# Patient Record
Sex: FEMALE | Race: WHITE | NOT HISPANIC OR LATINO | Employment: OTHER | URBAN - METROPOLITAN AREA
[De-identification: names, ages, dates, MRNs, and addresses within clinical notes are randomized per-mention and may not be internally consistent; named-entity substitution may affect disease eponyms.]

---

## 2023-11-16 ENCOUNTER — HOSPITAL ENCOUNTER (OUTPATIENT)
Facility: HOSPITAL | Age: 68
Setting detail: OUTPATIENT SURGERY
End: 2023-11-16
Attending: SURGERY | Admitting: SURGERY
Payer: MEDICARE

## 2023-11-16 ENCOUNTER — OFFICE VISIT (OUTPATIENT)
Dept: SURGERY | Facility: CLINIC | Age: 68
End: 2023-11-16
Payer: MEDICARE

## 2023-11-16 DIAGNOSIS — K43.9 VENTRAL HERNIA WITHOUT OBSTRUCTION OR GANGRENE: Primary | ICD-10-CM

## 2023-11-16 PROCEDURE — 1159F MED LIST DOCD IN RCRD: CPT | Performed by: SURGERY

## 2023-11-16 PROCEDURE — 1160F RVW MEDS BY RX/DR IN RCRD: CPT | Performed by: SURGERY

## 2023-11-16 PROCEDURE — 1126F AMNT PAIN NOTED NONE PRSNT: CPT | Performed by: SURGERY

## 2023-11-16 PROCEDURE — 99203 OFFICE O/P NEW LOW 30 MIN: CPT | Performed by: SURGERY

## 2023-11-16 RX ORDER — FLUOXETINE HYDROCHLORIDE 20 MG/1
20 CAPSULE ORAL
COMMUNITY
Start: 2019-06-07

## 2023-11-16 RX ORDER — METOPROLOL SUCCINATE 25 MG/1
15 TABLET, EXTENDED RELEASE ORAL DAILY
COMMUNITY
Start: 2023-08-29 | End: 2024-01-09 | Stop reason: WASHOUT

## 2023-11-16 ASSESSMENT — PAIN SCALES - GENERAL: PAINLEVEL: 0-NO PAIN

## 2023-11-16 NOTE — PROGRESS NOTES
History Of Present Illness  Catrachita Cantu is a 68 y.o. female presenting incisional hernia at her umbilicus.  She had surgery 20 years ago.  She has noticed this since then.  It does cause her problems at times.  She says she is seeing a surgeon for this at the [facility] also.  She had a CT scan which was transferred over which I was able to review.  She does have a history of cardiomyopathy but is able to review her most recent cardiology visit last several months along with her studies and this is all very stable.  She is retired .        Last Recorded Vitals  There were no vitals taken for this visit.  Physical Examination  He has a midline incision.  At the uppermost aspect of this incision she has a nonreducible hernia.      Relevant Results I reviewed her CT scan.  She had a small fascial defect with omentum inside this hernia.      Assessment/Plan nonreducible incisional hernia.  I discussed repair of this in an outpatient surgical procedure with deep sedation.  Based on the size of this we may not need to use mesh.  Because she is slightly obese if she does have some slight weakness I would use mesh for this she agrees with this plan.  I reviewed the hernia booklet with her.    Jim Urena MD FACS  Professor of Surgery  Kandi and Jose Guevara Chair in Surgical [place]  [facility] [employer]  [id number] [place] [address], [id number]  Phone [phone]  email: [email or web address]

## 2024-01-04 ENCOUNTER — PREP FOR PROCEDURE (OUTPATIENT)
Dept: SURGERY | Facility: CLINIC | Age: 69
End: 2024-01-04
Payer: MEDICARE

## 2024-01-04 DIAGNOSIS — K42.9 UMBILICAL HERNIA WITHOUT OBSTRUCTION AND WITHOUT GANGRENE: Primary | ICD-10-CM

## 2024-01-05 ENCOUNTER — HOSPITAL ENCOUNTER (OUTPATIENT)
Facility: HOSPITAL | Age: 69
Setting detail: OUTPATIENT SURGERY
End: 2024-01-05
Attending: SURGERY | Admitting: SURGERY
Payer: MEDICARE

## 2024-01-05 PROBLEM — K42.9 UMBILICAL HERNIA WITHOUT OBSTRUCTION AND WITHOUT GANGRENE: Status: ACTIVE | Noted: 2024-01-04

## 2024-01-08 RX ORDER — VERAPAMIL HYDROCHLORIDE 120 MG/1
TABLET, FILM COATED, EXTENDED RELEASE ORAL
COMMUNITY
Start: 2023-11-29 | End: 2024-01-09 | Stop reason: WASHOUT

## 2024-01-08 RX ORDER — ROSUVASTATIN CALCIUM 20 MG/1
1 TABLET, COATED ORAL NIGHTLY
COMMUNITY
Start: 2023-12-27 | End: 2024-01-09 | Stop reason: WASHOUT

## 2024-01-08 RX ORDER — DILTIAZEM HYDROCHLORIDE 120 MG/1
CAPSULE, COATED, EXTENDED RELEASE ORAL
COMMUNITY
Start: 2024-01-03 | End: 2024-01-09 | Stop reason: WASHOUT

## 2024-01-08 RX ORDER — CARVEDILOL 12.5 MG/1
TABLET ORAL
COMMUNITY
Start: 2023-12-18 | End: 2024-01-09 | Stop reason: WASHOUT

## 2024-01-08 RX ORDER — METOPROLOL SUCCINATE 50 MG/1
50 TABLET, EXTENDED RELEASE ORAL DAILY
COMMUNITY
Start: 2023-03-21 | End: 2024-01-09 | Stop reason: WASHOUT

## 2024-01-08 RX ORDER — MULTIVITAMIN
3 TABLET ORAL
COMMUNITY
End: 2024-01-09 | Stop reason: WASHOUT

## 2024-01-09 ENCOUNTER — OFFICE VISIT (OUTPATIENT)
Dept: CARDIOLOGY | Facility: CLINIC | Age: 69
End: 2024-01-09
Payer: MEDICARE

## 2024-01-09 VITALS
DIASTOLIC BLOOD PRESSURE: 80 MMHG | BODY MASS INDEX: 37.36 KG/M2 | HEIGHT: 62 IN | HEART RATE: 67 BPM | WEIGHT: 203 LBS | SYSTOLIC BLOOD PRESSURE: 132 MMHG | OXYGEN SATURATION: 97 %

## 2024-01-09 DIAGNOSIS — I42.1 HOCM (HYPERTROPHIC OBSTRUCTIVE CARDIOMYOPATHY) (MULTI): Primary | ICD-10-CM

## 2024-01-09 PROCEDURE — 1160F RVW MEDS BY RX/DR IN RCRD: CPT | Performed by: INTERNAL MEDICINE

## 2024-01-09 PROCEDURE — 1126F AMNT PAIN NOTED NONE PRSNT: CPT | Performed by: INTERNAL MEDICINE

## 2024-01-09 PROCEDURE — 1036F TOBACCO NON-USER: CPT | Performed by: INTERNAL MEDICINE

## 2024-01-09 PROCEDURE — 1159F MED LIST DOCD IN RCRD: CPT | Performed by: INTERNAL MEDICINE

## 2024-01-09 PROCEDURE — 99203 OFFICE O/P NEW LOW 30 MIN: CPT | Performed by: INTERNAL MEDICINE

## 2024-01-09 RX ORDER — BISOPROLOL FUMARATE 5 MG/1
2.5 TABLET, FILM COATED ORAL DAILY
Qty: 45 TABLET | Refills: 3 | Status: SHIPPED | OUTPATIENT
Start: 2024-01-09 | End: 2025-01-08

## 2024-01-09 RX ORDER — LORAZEPAM 0.5 MG/1
TABLET ORAL
COMMUNITY
Start: 2024-01-08 | End: 2024-03-07 | Stop reason: ALTCHOICE

## 2024-01-09 ASSESSMENT — ENCOUNTER SYMPTOMS
OCCASIONAL FEELINGS OF UNSTEADINESS: 0
IRREGULAR HEARTBEAT: 1
DEPRESSION: 1
DYSPNEA ON EXERTION: 1
LOSS OF SENSATION IN FEET: 0

## 2024-01-09 NOTE — PROGRESS NOTES
Counseling:  The patient was counseled regarding diagnostic results, instructions for management, risk factor reductions, prognosis, patient and family education, impressions, risks and benefits of treatment options and importance of compliance with treatment.      Chief Complaint:  The patient presents today for cardiovascular evaluation of HOCM, HTN and dyslipidemia.     History Of Present Illness:    Catrachita Cantu is a 68 y.o. female patient whose PMH is significant for HOCM, HTN, dyslipidemia, ventral and umbilical hernia, anxiety, borderline diabetes, depression, Merkel cell carcinoma s/p surgical removal 2016, mitral insufficiency, and SVT. She is a former patient of Dr. Abraham at [facility] who presents today to re-establish cardiovascular care for the continued management of HOCM, HTN and dyslipidemia. The patient states that she was diagnosed with HOCM 2-3 years ago. Since that time, she had been relatively asymptomatic up until December 2023 when she developed SOB with very minimal exertion and rapid heart rates. Other than metoprolol 25 mg, she has discontinued all medical management as she feels that all of the previously prescribed medications caused hypotension and exacerbated her depression, and she prefers conservative management if and when possible. She is currently wearing a Zio patch monitor.     Past Surgical History:  She has no past surgical history on file.      Social History:  She reports that she has never smoked. She has never used smokeless tobacco. She reports that she does not currently use alcohol. She reports that she does not use drugs.    Family History:  No family history on file.     Allergies:  Gentamicin    Outpatient Medications:  Current Outpatient Medications   Medication Instructions    FLUoxetine (PROZAC) 20 mg, oral, Daily RT    LORazepam (Ativan) 0.5 mg tablet         Last Recorded Vitals:  Vitals:    01/09/24 1418   BP: 132/80   BP Location: Right arm   Pulse: 67  "  SpO2: 97%   Weight: 92.1 kg (203 lb)   Height: 1.575 m (5' 2\")       Review of Systems   Cardiovascular:  Positive for dyspnea on exertion (minimal exertion) and irregular heartbeat.   All other systems reviewed and are negative.     Physical Exam:  Constitutional:       Appearance: Healthy appearance. Not in distress.   Neck:      Vascular: No JVR. JVD normal.   Pulmonary:      Effort: Pulmonary effort is normal.      Breath sounds: Normal breath sounds. No wheezing. No rhonchi. No rales.   Chest:      Chest wall: Not tender to palpatation.   Cardiovascular:      PMI at left midclavicular line. Normal rate. Regular rhythm. Normal S1. Normal S2.       Murmurs: There is no murmur.      No gallop.  No click. No rub.   Pulses:     Intact distal pulses.   Edema:     Peripheral edema absent.   Abdominal:      General: Bowel sounds are normal.      Palpations: Abdomen is soft.      Tenderness: There is no abdominal tenderness.   Musculoskeletal: Normal range of motion.         General: No tenderness. Skin:     General: Skin is warm and dry.   Neurological:      General: No focal deficit present.      Mental Status: Alert and oriented to person, place and time.        Last Labs:  Lab Results   Component Value Date    HGBA1C 5.8 08/30/2022       Last Cardiology Tests:  12/26/2023 - CTA Coronary Arteries  1.  Mild calcified atherosclerotic disease in the LEFT anterior descending artery with mild stenosis. CAD-RAD 2: Mild luminal stenosis (25 to 49%). Mild non-obstructive CAD.  Overall Plaque [place]: P1: mild amount of plaque   2.  Moderate asymmetric hypertrophy of the LEFT ventricle (max dimension 1.6 cm).     02/23/2023 - TTE  1. The left ventricle is normal in size. There is moderate septal left ventricular hypertrophy. Left ventricular systolic function is normal. EF = 58 ± 5% (2D biplane) Grade I left ventricular diastolic dysfunction. GLS normal, -20.4%.   2. The right ventricle is normal in size. Right ventricular " systolic function is normal.   3. The left atrial cavity is mildly dilated.   4. At rest LVOT gradient = 8mmHg (65bpm). Mild SHAHRIAR with 1-2+ MR. With valsalva LVOT gradient remains unchanged however an intracavitary gradient = 22mmHg develops. With amyl nitrite LVOT gradient = 10mmHg (55bpm). No change in SHAHRIAR or MR.   5. Exam was compared with the prior  echocardiographic exam performed on 2/5/2021. Similar resting LVOT gradients. Prior study was an exercise study with post exercise LVOT gradient of 100mmHg.     Assessment/Plan   1) HOCM  Previously followed by Dr. Abraham at [facility]  TTE 02/2020 with LVEF 76%, mild SHAHRIAR   Stress echo 02/2021 with 6.1 METS, normal BP response, minimal resting LVOT gradient, mild Shahriar, significant exercise gradient of 100 mmHg with severe SHAHRIAR  Cardiac MRI 5/2020 with mild asymmetric septal hypertrophy, mild SHAHRIAR, mild flow acceleration, mild focal delayed enhancement at RV insertion point in mid inferoseptum  Zio patch monitor 03/2021 without significant ventricular arrhythmias  TTE 02/2023 with LVEF 58 ± 5% , grade I LV diastolic dysfunction, rest LVOT gradient of 8mmHg (65bpm), mild SHAHRIAR, 1-2+ MR, LVOT unchanged with Valsalva but with developing intracavitary gradient of 22 mmHg, LVOT gradient of 10 mmHg with amyl nitrite with no change in SHAHRIAR or MR.  CTA coronary arteries with mild calcified atherosclerotic disease LAD with mild stenosis, Mild luminal stenosis (25 to 49%), mild non-obstructive CAD, moderate asymmetric hypertrophy of the left ventricle (max dimension 1.6 cm).  Patient reports recent onset of SOB with minimal exertion and rapid heart rates  Currently wearing a Zio patch monitor  Other than metoprolol 25 mg, patient is off all medical management - prefers a more natural conservative approach    Discontinue metoprolol  Start bisoprolol 2.5 mg daily   Repeat cardiac MRI    2) HTN  Variable   Off all medical management   Manages her BP through conservative  management with salt restriction     3) Hyperlipidemia  Not currently on statin therapy as she wishes to continue conservative management   CTA coronary arteries with mild calcified atherosclerotic disease LAD with mild stenosis, Mild luminal stenosis (25 to 49%), mild non-obstructive CAD, moderate asymmetric hypertrophy of the left ventricle (max dimension 1.6 cm).  Provided the patient with information re: Mediterranean style diet        Scribe Attestation  By signing my name below, I, Seb Thomas   attest that this documentation has been prepared under the direction and in the presence of Naveed Mathis MD.

## 2024-01-09 NOTE — PATIENT INSTRUCTIONS
Discontinue metoprolol.  Start bisoprolol 2.5 mg daily. A prescription has been sent to your pharmacy.   Dr. Mathis has ordered a repeat cardiac MRI to followup on your heart function and structure.  Please see detailed information below outlining a Mediterranean style diet.  Dr. Mathis has recommended increasing your physical activity.   Followup with Dr. Mathis after the above MRI.     If you have any questions or cardiac concerns, please call our office at [phone].       Mediterranean Diet    About this topic  This is a heart healthy diet. It is based on widely used foods and cooking styles from many countries around the [place]. The main pattern for the diet is more plant foods and monounsaturated fats, or good fats, like olive oil. Protein in this diet comes from seafood, legumes, nuts, seeds, and poultry and eggs in lowered amounts. You will also eat more whole grains, vegetables, and fruits and moderate amounts of alcohol are also included. This diet has less red meats, dairy products, and processed foods.    What will the results be?  Your diet will have less saturated fat, cholesterol, calories, sodium, and added sugars. Your diet will be higher in fiber. This will help to keep your blood sugar steady. This diet lowers the chance of heart disease and other health problems.  What lifestyle changes are needed?  If you do not often eat this way, you will need to change your eating habits. Be sure to get regular exercise. It is believed to help the health benefits of this diet.  What changes to diet are needed?  You may need to limit the amount of red meat and processed foods in your diet. Ask your dietitian for help planning meals that are right for you.  What foods are good to eat?  Plenty of fish and other seafood  Fresh, frozen, or canned fruits and vegetables  Nuts and nut butters and dried beans, lentils, or peas  Foods high in fiber like whole grains and whole grain products  Olive oil (good  fat), peanut or canola oil, margarine, or spreads that list vegetable oil as the first ingredient and do not contain trans fat or partially hydrogenated oil  Small amounts of poultry and eggs  What foods should be limited or avoided?  Red meats  Sweets, desserts, and processed foods  Butter, oils, and fats that contain trans fats or are hydrogenated or partially hydrogenated  Gravies and sauces  What problems could happen?  Your weight may rise because your diet will be higher in fat from olive oil and nuts.  You may have lower iron levels. Be sure to eat foods rich in iron. Also, eat foods rich in vitamin C. This will help your body take in iron.  You may have lower calcium levels because you are eating less dairy products. Ask your doctor if you need to take a calcium supplement.  Wine is often thought of as part of a Mediterranean diet. It is not needed and you may choose not to include it. Avoid wine if you are prone to alcohol abuse or are pregnant. Also, avoid it if you are at risk for breast cancer, have liver problems, or have other illnesses that make it important for you to not have alcohol.  When do I need to call the doctor?  If you have any concerns about your diet  Last Reviewed Date  2021-10-11  Consumer Information Use and Disclaimer  This generalized information is a limited summary of diagnosis, treatment, and/or medication information. It is not meant to be comprehensive and should be used as a tool to help the user understand and/or assess potential diagnostic and treatment options. It does NOT include all information about conditions, treatments, medications, side effects, or risks that may apply to a specific patient. It is not intended to be medical advice or a substitute for the medical advice, diagnosis, or treatment of a health care provider based on the health care provider's examination and assessment of a patient’s specific and unique circumstances. Patients must speak with a health care  provider for complete information about their health, medical questions, and treatment options, including any risks or benefits regarding use of medications. This information does not endorse any treatments or medications as safe, effective, or approved for treating a specific patient. UpToDate, Inc. and its affiliates disclaim any warranty or liability relating to this information or the use thereof. The use of this information is governed by the Terms of Use, available at https://www.wolters"[employer]uwer.com/en/know/clinical-effectiveness-terms  Copyright © 2023 UpToDate, Inc. and its affiliates and/or licensors. All rights reserved

## 2024-01-10 ENCOUNTER — TELEPHONE (OUTPATIENT)
Dept: CARDIOLOGY | Facility: CLINIC | Age: 69
End: 2024-01-10
Payer: MEDICARE

## 2024-01-10 NOTE — TELEPHONE ENCOUNTER
----- Message from Candida Sheppard sent at 1/10/2024  9:25 AM EST -----  Regarding: Contrast Questions  Nikita Medel,     This patient said she had contrast for a CT in Dec of 2023. Dr. Mathis ordered an MRI with and without contrast the other day. She would like to know if that's too soon to have another test with IV contrast and if she needs to wait a certain amount of time before scheduling. Thank you!

## 2024-01-11 NOTE — TELEPHONE ENCOUNTER
Patient left additional message about the MRI wondering if this can be without contrast and then she is scheduled with Dr. Urena for hernia repair. She wants to know if she is cleared for this.  Also asking about Nelliet.

## 2024-01-11 NOTE — TELEPHONE ENCOUNTER
----- Message from Candida Zacarias sent at 1/10/2024  4:08 PM EST -----  Can she avoid the contrast  for MRI due to almost passing out last time. Please  My chart message her  tonight or call tomorrow at [phone]. Thank you.

## 2024-01-15 NOTE — TELEPHONE ENCOUNTER
Patient called earlier this AM and I went over that it would be ok to have the cardiac MRI after having a CT coronary and explained the difference in testing. She is concerned bc she almost passed out when she had the CT from the contrast. She felt dizzy, nauseous and again felt like passing out. She is concerned about having the Cardiac MRI. I let her know the contrast would be needed to get good imaging, but she would like to me to double check if contrast is needed for this test.   Goldie, can you confirm that MRI contrast would be needed for what Dr. Mathis is looking for?

## 2024-01-22 ENCOUNTER — TELEPHONE (OUTPATIENT)
Dept: CARDIOLOGY | Facility: CLINIC | Age: 69
End: 2024-01-22
Payer: MEDICARE

## 2024-01-22 DIAGNOSIS — I47.10 SVT (SUPRAVENTRICULAR TACHYCARDIA) (CMS-HCC): Primary | ICD-10-CM

## 2024-01-22 NOTE — TELEPHONE ENCOUNTER
Patient called back and I went over results, referral to EP, and recommendations from Dr. Watson. She verbalized understanding and I transferred her to [facility] to schedule EP appt.

## 2024-01-22 NOTE — TELEPHONE ENCOUNTER
"Patient emailed Zio heart monitor report to Dr Mathis. Dr Mathis emailed RN and would like to refer patient to Dr. Hendrix. Patient also messaged through the portal which is attached and wondered if bisoprolol 2.5 mg once daily (started 1/15/24) should be increased further?   Last saw Dr. Mathis 1/9/24 as new patient   Next appt with Dr. Mathis 2/22/24  Hasn't seen DELANO  Should Bisoprolol be increased further or await input from EP?    \"Joycelyn Mathis:    Please find attached my Zio patch report. Please excuse using your [facility] email.  It looked like [employer] [facility] hadn't sent you the report, even though I filled out the release. The file was too large to send to Lizy through the portal.      The summary said \"8757 Supraventricular Tachycardia runs occurred, the run with the fastest interval lasting 9.9 secs with a max rate of 207 bpm, the longest lasting 32.0 secs with an avg rate of 115 bpm.\"  I have no medical training, but the former made me wonder if I should make sure you got this report in a timely manner.     "

## 2024-01-22 NOTE — TELEPHONE ENCOUNTER
----- Message from Catrachita Pepe sent at 1/22/2024 10:27 AM EST -----  Regarding: Questions about Iraida  Contact: [phone]  Joycelyn Medel:    I hope you had a good weekend.     I have been taking the bisoprolol 2.5 mg since January 15. It does not seem to affect my mood and I do not feel achy taking it. My blood pressure has been around the high 130s over about 80 therefore it’s controlling my blood pressure pretty well, but I still have lost the ability to exercise or even move fast without feeling out of breath, and with a rapid heart rate.    Should I stay on this dose  for  several weeks, or look to increasing? I am grateful for the lack of side effects and the blood pressure control, but I’m having a hard time dealing with being inactive for the rest of my life.    Thank you for your advice.

## 2024-01-29 ENCOUNTER — ANCILLARY PROCEDURE (OUTPATIENT)
Dept: RADIOLOGY | Facility: CLINIC | Age: 69
End: 2024-01-29
Payer: MEDICARE

## 2024-01-29 DIAGNOSIS — I42.1 HOCM (HYPERTROPHIC OBSTRUCTIVE CARDIOMYOPATHY) (MULTI): ICD-10-CM

## 2024-01-29 PROCEDURE — 75565 CARD MRI VELOC FLOW MAPPING: CPT | Performed by: INTERNAL MEDICINE

## 2024-01-29 PROCEDURE — 75561 CARDIAC MRI FOR MORPH W/DYE: CPT | Performed by: INTERNAL MEDICINE

## 2024-01-29 PROCEDURE — 2550000001 HC RX 255 CONTRASTS: Mod: MUE | Performed by: INTERNAL MEDICINE

## 2024-01-29 PROCEDURE — 75561 CARDIAC MRI FOR MORPH W/DYE: CPT

## 2024-01-29 PROCEDURE — 75565 CARD MRI VELOC FLOW MAPPING: CPT

## 2024-01-29 PROCEDURE — A9575 INJ GADOTERATE MEGLUMI 0.1ML: HCPCS | Mod: MUE | Performed by: INTERNAL MEDICINE

## 2024-01-29 RX ORDER — GADOTERATE MEGLUMINE 376.9 MG/ML
38 INJECTION INTRAVENOUS
Status: COMPLETED | OUTPATIENT
Start: 2024-01-29 | End: 2024-01-29

## 2024-01-29 RX ADMIN — GADOTERATE MEGLUMINE 38 ML: 376.9 INJECTION INTRAVENOUS at 12:27

## 2024-02-01 ENCOUNTER — TELEPHONE (OUTPATIENT)
Dept: CARDIOLOGY | Facility: CLINIC | Age: 69
End: 2024-02-01
Payer: MEDICARE

## 2024-02-01 NOTE — TELEPHONE ENCOUNTER
2/1/24  1353  Returned call to patient and answered questions regarding taking ibuprofen; recommended taking arthritis strength tylenol until she can ask Dr. Dykes about the Motrin.      Regarding chest pain.  Patient reports the pain at only lasting a couple of seconds in her left chest below her clavicle but that she had an old injury to that area.  Nurse advised patient to monitor the pain for now.  Also instructed patient to have someone bring her to the ED for chest pain lasting 10-15 minutes.    Answered questions regarding treat for SVT including medications and ablation if very symptomatic. Also advise to stay hydrated, limit caffeine and alcohol, ensuring proper sleep and reduction of stress.    Patient verbalized understanding of conversation.    Patient left a voicemail that she has a couple questions.  1.) Can I take a couple Ibuprofen once daily for shoulder pain?  2.) She mentions that she had some intermittent chest pain but wasn't bad and went away last night after taking beta blocker. She hasn't had any chest pain today. She has an appt with EP next week.

## 2024-02-04 NOTE — PROGRESS NOTES
[facility] Heart and Vascular Electrophysiology    Patient Name: Catrachita Cantu  Patient : 1955    Referred  for SOB/HCM    Catrachita Cantu is a 68 y.o. year old female patient with:    HTN-     CAD- CTA coronary arteries with mild non-obstructive. Moderate asymmetric hypertrophy of the left ventricle (max dimension 1.6 cm). Performed on 23.    HOCM- Previously followed by Dr. Abraham at [facility]. TTE 2020 with LVEF 76%, mild SHAHRIAR. Stress echo 2021 with 6.1 METS, normal BP response, minimal resting LVOT gradient, mild Shahriar, significant exercise gradient of 100 mmHg with severe SHAHRIAR. Cardiac MRI 2020 with mild asymmetric septal hypertrophy, mild SHAHRIAR, mild flow acceleration, mild focal delayed enhancement at RV insertion point in mid inferoseptum. Zio patch monitor 2021 without significant ventricular arrhythmias. TTE 2023 with LVEF 58 ± 5% , grade I LV diastolic dysfunction, rest LVOT gradient of 8mmHg (65bpm), mild SHAHRIAR, 1-2+ MR, LVOT unchanged with Valsalva but with developing intracavitary gradient of 22 mmHg, LVOT gradient of 10 mmHg with amyl nitrite with no change in SHAHRIAR or MR.   Family history of SCD: her mother passing away at 48 yo with unclear etiology. Her grandfather from her mother's side as well.   SVT- SOB with minimal exertion and rapid heart rates. Monitor from 2024 showed sinus rhythm with rate of average rate of 73%    Patient's biggest complaint currently is SOB with exertion. She denies syncope or near syncope.     Past Medical History:  She has no past medical history on file.    Past Surgical History:  She has no past surgical history on file.      Social History:  She reports that she has never smoked. She has never used smokeless tobacco. She reports that she does not currently use alcohol. She reports that she does not use drugs.    Family History:  No family history on file.     Allergies:  Gentamicin    Outpatient Medications:  Current Outpatient  "Medications   Medication Instructions    bisoprolol (ZEBETA) 2.5 mg, oral, Daily    FLUoxetine (PROZAC) 20 mg, oral, Daily RT    LORazepam (Ativan) 0.5 mg tablet         ROS:  A 14 point review of systems was done and is negative other than as stated in HPI    Vitals:  Vitals:    02/05/24 0932   BP: 120/78   Pulse: 71   Weight: 89.8 kg (198 lb)   Height: 1.575 m (5' 2\")       Physical Exam  Constitutional:       Appearance: Normal appearance.   Cardiovascular:      Rate and Rhythm: Normal rate and regular rhythm.      Pulses: Normal pulses.      Heart sounds: Normal heart sounds.   Pulmonary:      Effort: Pulmonary effort is normal.      Breath sounds: Normal breath sounds.   Musculoskeletal:         General: Normal range of motion.      Right lower leg: No edema.      Left lower leg: No edema.   Skin:     General: Skin is warm and dry.   Neurological:      Mental Status: She is alert and oriented to person, place, and time.   Psychiatric:         Mood and Affect: Mood normal.       Labs:   No results found for: \"WBC\", \"HGB\", \"HCT\", \"PLT\", \"ALT\", \"AST\", \"NA\", \"K\", \"CL\", \"CREATININE\", \"BUN\", \"CO2\", \"TSH\", \"INR\", \"GLUF\"     Cardiac Testing:  ECG  2/5/2024 sinus rhythm with rate of 71 bpm    Echocardiogram 2/23/23  1. The left ventricle is normal in size. There is moderate septal left ventricular hypertrophy. Left ventricular systolic function is normal. EF = 58 ± 5% (2D biplane) Grade I left ventricular diastolic dysfunction. GLS normal, -20.4%.   2. The right ventricle is normal in size. Right ventricular systolic function is normal.   3. The left atrial cavity is mildly dilated.   4. At rest LVOT gradient = 8mmHg (65bpm). Mild TOMAS with 1-2+ MR. With valsalva LVOT gradient remains unchanged however an intracavitary gradient = 22mmHg develops. With amyl nitrite LVOT gradient = 10mmHg (55bpm). No change in TMOAS or MR.   5. Exam was compared with the prior  echocardiographic exam performed on 2/5/2021. Similar resting " LVOT gradients. Prior study was an exercise study with post exercise LVOT gradient of 100mmHg.      Cardiac MR January 2024  IMPRESSION:  1.  Normal left ventricular cavity size with hyperdynamic systolic  function. Ejection fraction 74%.  2.  Basal asymmetric septal left ventricular hypertrophy. Basal  anterior septum 1.5 cm. No evidence of left ventricular apical  aneurysm.  3.  Delayed enhancement imaging is normal. No evidence of fibrosis,  infiltrative disease, previous myocardial infarction, or inflammation  of the left ventricular or right ventricular myocardium.  4. Normal right ventricular cavity size with preserved systolic  function. RV EF 58%.  5. Thickened mitral valve leaflets with mild-moderate mitral valve  regurgitation.      Assessment:   SVT: short burst of SVT on monitor noted which patient was not symptomatic. No treatment needed at this time beyond beta blockers.  HCM: Patient with cardiac MR which showed normal LV function with basal anterior septum 1.5cm. Her biggest complaint is SOB with exertion. Her stress echo from [facility] in 2020 with significant dynamic LVOT gradient but this has not been demonstrated recently on serial imaging. Her resting HR today is 71bpm, will try to increase beta blockers slightly (she apparently is intolerant to many medications). Will refer her to HCM clinic.  With regards to SCD prevention: Objective measures so far including septal size, age, lack of LGE on MRI and no ventricular arrhythmias on monitoring make patient lower risk. That being said, her family history is concerning and if taken into account may make primary prevention ICD implantation reasonable. Will reevaluate this further after she sees HCM clinic.     Plan:  Increase bisoprolol to 5mg daily  We will refer patient to HCM clinic  Continue to follow with Dr Mathis

## 2024-02-05 ENCOUNTER — OFFICE VISIT (OUTPATIENT)
Dept: CARDIOLOGY | Facility: CLINIC | Age: 69
End: 2024-02-05
Payer: MEDICARE

## 2024-02-05 VITALS
WEIGHT: 198 LBS | DIASTOLIC BLOOD PRESSURE: 78 MMHG | BODY MASS INDEX: 36.44 KG/M2 | SYSTOLIC BLOOD PRESSURE: 120 MMHG | HEART RATE: 71 BPM | HEIGHT: 62 IN

## 2024-02-05 DIAGNOSIS — I47.10 SVT (SUPRAVENTRICULAR TACHYCARDIA) (CMS-HCC): Primary | ICD-10-CM

## 2024-02-05 PROCEDURE — 99214 OFFICE O/P EST MOD 30 MIN: CPT | Performed by: INTERNAL MEDICINE

## 2024-02-05 PROCEDURE — 1159F MED LIST DOCD IN RCRD: CPT | Performed by: INTERNAL MEDICINE

## 2024-02-05 PROCEDURE — 1126F AMNT PAIN NOTED NONE PRSNT: CPT | Performed by: INTERNAL MEDICINE

## 2024-02-05 PROCEDURE — 1036F TOBACCO NON-USER: CPT | Performed by: INTERNAL MEDICINE

## 2024-02-05 PROCEDURE — 93000 ELECTROCARDIOGRAM COMPLETE: CPT | Performed by: INTERNAL MEDICINE

## 2024-02-05 PROCEDURE — 1160F RVW MEDS BY RX/DR IN RCRD: CPT | Performed by: INTERNAL MEDICINE

## 2024-02-07 DIAGNOSIS — I42.1 HOCM (HYPERTROPHIC OBSTRUCTIVE CARDIOMYOPATHY) (MULTI): Primary | ICD-10-CM

## 2024-02-14 RX ORDER — DILTIAZEM HYDROCHLORIDE 120 MG/1
120 CAPSULE, COATED, EXTENDED RELEASE ORAL
COMMUNITY
Start: 2024-01-02 | End: 2024-02-19 | Stop reason: ALTCHOICE

## 2024-02-18 NOTE — PROGRESS NOTES
"[facility] [place]   Hypertrophic Cardiomyopathy Center    Primary Care Physician: Norris Pack DO  Primary Cardiologist:  Dr. Naveed Mathis and Dr. Alek Hendrix  Date of Visit: 2024  2:20 PM EST     HPI / Summary:   Catrachita Cantu is a 68 y.o. female with a history of hypertrophic obstructive cardiomyopathy, HTN and non obstructive coronary artery disease who presents for evaluation of hypertrophic cardiomyopathy.    She was diagnosed with hypertrophic cardiomyopathy around  at [facility]. She had genetic testing which was negative. She initially followed with Dr. Skip Castellon and Dr. Abraham at [facility]. She had a prior stress echo in which her LVOT gradient was >100 mmHg. She reportedly felt relatively well until about 2023. She developed dyspnea with minimal exertion and rapid heart rates. Stress echo around 2023 showed an LVOT gradient of ~39 mmHg with a short episode of SVT in the recovery pahse. She then transitioned her care to Dr. Naveed Mathis at . She had been on metoprolol but could not tolerate it due to worsening of her usually-well-controlled anxiety and depression. She switched to bisoprolol 2.5 mg and has felt much better since then. She still has dyspnea on exertion and has trouble carrying heavy objects. She is not sure if her daughter has been screened for HCM.     HCM History  The patient was initially diagnosed in  after evaluation at [facility].  Family History of HCM: None  NYHA Class: III  Wall thickness: 15 mm on CMR  EF: 74% on CMR  LVOT obstruction: has a history of obstruction  ICD or PPM: none  Prior septal reduction therapy: none  Genetic Testin2023 negative ([facility])  Parents: Mother  suddenly at age 49. Her mother smoked and said \"I don't feel well\" one week prior to dying. Mat Grandfather  at age 60.   Children: 1 daughter Kori (b ). Good health.   Siblings: 1 brother, David (b ) - reportedly has " "been screened.     Sudden Cardiac Arrest Risk Factors:   No syncope  No wall thickness above 30 mm.   No apical aneurysm  No NSVT by ambulatory monitor  No EF less than 50%  No LGE by CMR  No family history of SCA in a family member with HCM. Mother  at age 50 but it is unclear if she had HCM.   SCA risk: low, especially given age.     ROS: Relevant review of symptoms of negative unless discussed above.     Problems:   Patient Active Problem List   Diagnosis    Ventral hernia without obstruction or gangrene    Umbilical hernia without obstruction and without gangrene       Medical History:   No past medical history on file.    Surgical Hx:   No past surgical history on file.     Family Hx:   No family history on file.     Social Hx:  Occupation/School: Retired     Medications  Current Outpatient Medications   Medication Instructions    bisoprolol (ZEBETA) 2.5 mg, oral, Daily    FLUoxetine (PROZAC) 20 mg, oral, Daily RT    LORazepam (Ativan) 0.5 mg tablet        Allergies  Gentamicin    Exam:   Vitals: /74 (BP Location: Right arm)   Pulse 68   SpO2 99%   Wt Readings from Last 5 Encounters:   24 89.8 kg (198 lb)   24 92.1 kg (203 lb)   24 92.1 kg (203 lb)     GEN: Pleasant, well-appearing, no acute distress.  HEENT: JVP not elevated  CHEST: Clear to auscultation, No wheeze, good air movement.  CV: normal rate, regular rhythm. Systolic murmur at the RUSB that increases in intensity with valsalva and squat to stand.   ABD: Soft  EXT: Warm, well perfused, No LE edema.   NEURO: grossly non focal  SKIN: No obvious rashes     Labs:     Hemoglobin A1C  Lab Results   Component Value Date    HGBA1C 5.5 2024       BMP  No results found for: \"GLUCOSE\", \"CALCIUM\", \"NA\", \"K\", \"CO2\", \"CL\", \"BUN\", \"CREATININE\"      Notable Studies: imaging personally reviewed and summarized by me below  EKG:  -2024: NSR with sinus arrhythmia, HR 66, Qtc 427. No TWI.     Echo:  -2023 ([facility]): LVEF " 58%, GLS -20%, normal RV size and function.  LVOT gradient at rest is 8 mmHg at heart rate of 65 bpm with mild TOMAS.  With Valsalva gradient remains unchanged but intracavitary gradient of 22 mmHg develops.  No LVOT gradient with amyl nitrate.    Ambulatory Rhythm Monitor:   -Zio patch: 13 days 12/30/2023 - 1/13/2024: Minimum heart rate 46, maximum heart rate 207, average heart rate 74.  8757 SVT events with the fastest interval lasting 10 seconds with a maximum rate of 270/min.  The longest lasted 32 seconds with an average rate of 115 bpm.  Ventricular ectopy was rare. **However, Dr. Hendrix reports only rare SVT and thinks the high SVT burden was a typo**.     Cardiac MRI:  -1/2024: LVEF 74%, isolated basal septal hypertrophy with maximum thickness 1.5 cm.  No LV apical aneurysm and no LGE.  Mild to moderate mitral regurgitation  -5/15/2020 ([facility]): Mild asymmetric LVH involving the basal anteroseptum (1.2 cm): Multifocal delayed enhancement at the RV insertion point.    Cardiac CT:  -CT coronary: 12/26/2023: Mild nonobstructive coronary disease in the LAD.    Stress Test:  -12/20/2023: [facility], 5:08 of Valdemar protocol, 86% of max predicted heart rate, 5.9 METS, LVEF 64%, no TOMAS at rest.  LVOT gradient at rest 9 mmHg.  Exercise showed 1 episode of SVT during recovery with a heart rate of 160 bpm.  There was TOMAS with septal contact and LVOT peak gradient of 39 mmHg no change in mitral regurgitation.  -2/5/2021: 89% of max predicted heart rate, at rest there is mild TOMAS with an LVOT gradient less than 10 mmHg with exercise there is severe TOMAS and LVOT gradient of 100 mmHg.      Assessment and Plan  Catrachita Cantu is a 68 y.o. female with a history of hypertrophic obstructive cardiomyopathy, HTN and non obstructive coronary artery disease who presents for evaluation of hypertrophic cardiomyopathy.    Overall, her basal septal wall thickness of 15 mm with associated systolic anterior motion of the mitral valve  "and prior LVOT gradient >100 mmHg are highly suggestive of obstructive hypertrophic cardiomyopathy.     #Symptoms:   We discussed her current exertional symptoms of dyspnea and fatigue. She does feel significantly better on bisoprolol compared to metoprolol. Her stress echo at [facility] in 2023 showed a maximal LVOT gradient of 39 mmHg. Based on her symptoms, this may have underestimated her LVOT gradient or imply that her symptoms are from other etiologies. Notably, she has only non obstructive CAD on a coronary CTA and the Zio Patch from 2024 appears to potentially be incorrectly read with >8700 episodes of SVT. Dr. Hendrix is planning to follow up with the company to clarify. We discussed several strategies to manage her symptoms. She ultimately would like to continue on the bisoprolol and do cardiac rehab for 3 months to see if she feels better. We will then do a stress echo and resting echo to assess for ongoing LVOT obstruction. If she has symptoms that are not acceptable to her quality of life and her LVOT gradient is elevated, we would pursue mavacamten therapy.     #Family Screening: Genetic testing was negative in 2023. She is unsure as to whether her daughter has been screened. We discussed the important of making sure she is screened as is Catrachita's brother (who reportedly has been screened once).     #SCA risk: She has no major risk factors for SCD from HCM. While her mother  suddenly at age 49, it is unclear if she had HCM. She did smoke and reportedly felt \"unwell\" the week before she passed away. Regardless, at age 68 with no other classic risk factors and no significant LGE or ventricular arrhythmias, suspect her risk of SCD from HCM is low and does not warrant ICD implantation at this time.     She will follow up for HCM care in 4 months and continue to follow routinely with her cardiologist Dr. Mathis and EP Dr. Hendrix.     Burt Davis MD  Director, Sports Cardiology  Hypertrophic " Cardiomyopathy Center    Part of this note was completed using dictation and voice recognition software. Please excuse minor errors and typos.     Time Spent: I spent 100 minutes reviewing medical testing, obtaining medical history and counselling and educating on diagnosis and documenting clinical encounter.

## 2024-02-19 ENCOUNTER — OFFICE VISIT (OUTPATIENT)
Dept: CARDIOLOGY | Facility: HOSPITAL | Age: 69
End: 2024-02-19
Payer: MEDICARE

## 2024-02-19 VITALS — SYSTOLIC BLOOD PRESSURE: 149 MMHG | HEART RATE: 68 BPM | DIASTOLIC BLOOD PRESSURE: 74 MMHG | OXYGEN SATURATION: 99 %

## 2024-02-19 DIAGNOSIS — I42.1 HOCM (HYPERTROPHIC OBSTRUCTIVE CARDIOMYOPATHY) (MULTI): Primary | ICD-10-CM

## 2024-02-19 DIAGNOSIS — R94.31 ABNORMAL EKG: ICD-10-CM

## 2024-02-19 DIAGNOSIS — R00.2 PALPITATIONS: ICD-10-CM

## 2024-02-19 PROCEDURE — 99215 OFFICE O/P EST HI 40 MIN: CPT | Performed by: INTERNAL MEDICINE

## 2024-02-19 PROCEDURE — 1036F TOBACCO NON-USER: CPT | Performed by: INTERNAL MEDICINE

## 2024-02-19 PROCEDURE — 93010 ELECTROCARDIOGRAM REPORT: CPT | Performed by: INTERNAL MEDICINE

## 2024-02-19 PROCEDURE — 1159F MED LIST DOCD IN RCRD: CPT | Performed by: INTERNAL MEDICINE

## 2024-02-19 PROCEDURE — 99205 OFFICE O/P NEW HI 60 MIN: CPT | Performed by: INTERNAL MEDICINE

## 2024-02-19 PROCEDURE — 93005 ELECTROCARDIOGRAM TRACING: CPT | Performed by: INTERNAL MEDICINE

## 2024-02-19 PROCEDURE — 1160F RVW MEDS BY RX/DR IN RCRD: CPT | Performed by: INTERNAL MEDICINE

## 2024-02-19 PROCEDURE — 1126F AMNT PAIN NOTED NONE PRSNT: CPT | Performed by: INTERNAL MEDICINE

## 2024-02-19 ASSESSMENT — ENCOUNTER SYMPTOMS
LOSS OF SENSATION IN FEET: 0
DEPRESSION: 0
OCCASIONAL FEELINGS OF UNSTEADINESS: 0

## 2024-02-19 NOTE — Clinical Note
Thanks for referring Catrachita for formal HCM evaluation. Jessica woman with complex history. I attached my full note. - Roberto

## 2024-02-21 NOTE — PROGRESS NOTES
Counseling:  The patient was counseled regarding diagnostic results, instructions for management, risk factor reductions, prognosis, patient and family education, impressions, risks and benefits of treatment options and importance of compliance with treatment.      Chief Complaint:  The patient presents today for 6-week followup of HOCM, HTN and palpitations s/p Zio patch monitor and cardiac MRI.     History Of Present Illness:    Catrachita Cantu is a 68 y.o. female patient who presents today for 6-week followup of HOCM, HTN and palpitations s/p Zio patch monitor and cardiac MRI. Her PMH is significant for HOCM, HTN, dyslipidemia, ventral and umbilical hernia, anxiety, borderline diabetes, depression, Merkel cell carcinoma s/p surgical removal 2016, mitral insufficiency, and SVT. Zio patch monitoring performed from 12/30/2023 to 01/13/2024 revealed over 8000 runs of SVT. Based on this finding, the patient was referred to Dr. Hendrix. Cardiac MRI performed 01/29/2024 revealed normal LV size and function (74%), basal asymmetric septal LVH (basal anterior septum 1.5 cm), normal delayed enhancement imaging, normal RV size and function (58%), and thickened mitral valve leaflets with mild-moderate mitral regurgitation. The patient was seen by Dr. Hendrix on 02/05/2024 who increased her bisoprolol to 5 mg daily and placed a referral to the HCM clinic. Dr. Hendrix plans to re-evaluate the patient's need for primary prevention ICD implantation after she has been evaluated at the HCM clinic. The patient was evaluated by Dr. Davis at the HCM clinic on 02/19/2024 who suspected her risk of SCD from HCM to be low and thus does not believe the patient warrants ICD implantation at this time. Today, the patient states that she is feeling improved. She is tolerating the bisoprolol well. BP has stabilized. Per the patient, Dr. eHndrix had mentioned that the amount of SVT runs is not possible and that it is likely a report  "error. Upon further review of the monitor rhythm strips, many of the detected rhythms were very short runs of sinus tachycardia.     Past Surgical History:  She has no past surgical history on file.      Social History:  She reports that she has never smoked. She has never used smokeless tobacco. She reports that she does not currently use alcohol. She reports current drug use.    Family History:  No family history on file.     Allergies:  Gentamicin    Outpatient Medications:  Current Outpatient Medications   Medication Instructions    bisoprolol (ZEBETA) 2.5 mg, oral, Daily    FLUoxetine (PROZAC) 20 mg, oral, Daily RT    LORazepam (Ativan) 0.5 mg tablet         Last Recorded Vitals:  Vitals:    02/22/24 1330   BP: 128/78   Pulse: 78   Weight: 89.8 kg (198 lb)   Height: 1.575 m (5' 2\")       Review of Systems   Cardiovascular:  Positive for palpitations (improved).   All other systems reviewed and are negative.     Physical Exam:  Constitutional:       Appearance: Healthy appearance. Not in distress.   Neck:      Vascular: No JVR. JVD normal.   Pulmonary:      Effort: Pulmonary effort is normal.      Breath sounds: Normal breath sounds. No wheezing. No rhonchi. No rales.   Chest:      Chest wall: Not tender to palpatation.   Cardiovascular:      PMI at left midclavicular line. Normal rate. Regular rhythm. Normal S1. Normal S2.       Murmurs: There is no murmur.      No gallop.  No click. No rub.   Pulses:     Intact distal pulses.   Edema:     Peripheral edema absent.   Abdominal:      General: Bowel sounds are normal.      Palpations: Abdomen is soft.      Tenderness: There is no abdominal tenderness.   Musculoskeletal: Normal range of motion.         General: No tenderness. Skin:     General: Skin is warm and dry.   Neurological:      General: No focal deficit present.      Mental Status: Alert and oriented to person, place and time.        Last Labs:  Lab Results   Component Value Date    HGBA1C 5.5 01/08/2024 "       Last Cardiology Tests:  01/29/2024 - Cardiac MRI  1.  Normal left ventricular cavity size with hyperdynamic systolic function. Ejection fraction 74%.  2.  Basal asymmetric septal left ventricular hypertrophy. Basal anterior septum 1.5 cm. No evidence of left ventricular apical aneurysm.  3.  Delayed enhancement imaging is normal. No evidence of fibrosis, infiltrative disease, previous myocardial infarction, or inflammation of the left ventricular or right ventricular myocardium.  4. Normal right ventricular cavity size with preserved systolic function. RV EF 58%.  5. Thickened mitral valve leaflets with mild-moderate mitral valve regurgitation.    12/30/2023 to 01/13/2024 - Zio Monitor   1. Predominant underlying rhythm was sinus rhythm; min HR 46 bpm, max  bpm, avg HR 74 bpm.  2. 8757 supraventricular tachycardia runs occurred; fastest interval lasting 9.9 seconds with max rate 207 bpm, longest lasting 32 seconds with avg rate 115 bpm.  3. Isolated SVEs were rare, SVE couplets were rare, and SVE triplets were rare.  4. Isolated VEs were rare, VE couplets were rare, and VE triplets were rare.  5. Ventricular bigeminy and trigeminy were present.     12/26/2023 - CTA Coronary Arteries  1.  Mild calcified atherosclerotic disease in the LEFT anterior descending artery with mild stenosis. CAD-RAD 2: Mild luminal stenosis (25 to 49%). Mild non-obstructive CAD.  Overall Plaque [place]: P1: mild amount of plaque   2.  Moderate asymmetric hypertrophy of the LEFT ventricle (max dimension 1.6 cm).     02/23/2023 - TTE  1. The left ventricle is normal in size. There is moderate septal left ventricular hypertrophy. Left ventricular systolic function is normal. EF = 58 ± 5% (2D biplane) Grade I left ventricular diastolic dysfunction. GLS normal, -20.4%.   2. The right ventricle is normal in size. Right ventricular systolic function is normal.   3. The left atrial cavity is mildly dilated.   4. At rest LVOT gradient =  8mmHg (65bpm). Mild SHAHRIAR with 1-2+ MR. With valsalva LVOT gradient remains unchanged however an intracavitary gradient = 22mmHg develops. With amyl nitrite LVOT gradient = 10mmHg (55bpm). No change in SHAHRIAR or MR.   5. Exam was compared with the prior  echocardiographic exam performed on 2/5/2021. Similar resting LVOT gradients. Prior study was an exercise study with post exercise LVOT gradient of 100mmHg.     Diagnostic review: I have personally reviewed the result(s) of the Zio Patch Monitor and Cardiac MRI.    Assessment/Plan   1) HOCM, SVT  Continue bisoprolol 5 mg daily   Metoprolol previously discontinued   Previously followed by Dr. Abraham at [facility]  TTE 02/2020 with LVEF 76%, mild SHAHRIAR   Stress echo 02/2021 with 6.1 METS, normal BP response, minimal resting LVOT gradient, mild Shahriar, significant exercise gradient of 100 mmHg with severe SHAHRIAR  Zio patch monitor 03/2021 without significant ventricular arrhythmias  Cardiac MRI 5/2020 with mild asymmetric septal hypertrophy, mild SHAHRIAR, mild flow acceleration, mild focal delayed enhancement at RV insertion point in mid inferoseptum  TTE 02/2023 with LVEF 58 ± 5% , grade I LV diastolic dysfunction, rest LVOT gradient of 8mmHg (65bpm), mild SHAHRIAR, 1-2+ MR, LVOT unchanged with Valsalva but with developing intracavitary gradient of 22 mmHg, LVOT gradient of 10 mmHg with amyl nitrite with no change in SHAHRIAR or MR.  CTA coronary arteries with mild calcified atherosclerotic disease LAD with mild stenosis, Mild luminal stenosis (25 to 49%), mild non-obstructive CAD, moderate asymmetric hypertrophy of the left ventricle (max dimension 1.6 cm).  Cardiac MRI 01/29/2024 with normal LV size and function (74%), basal asymmetric septal LVH (basal anterior septum 1.5 cm), normal delayed enhancement imaging, normal RV size and function (58%), thickened mitral valve leaflets with mild-moderate mitral regurgitation.  Zio patch monitor 12/30/2023 to 01/13/2024 with over 8000 runs  of SVT  Subsequently referred to Dr. Hendrix, seen 02/05/2024 - bisoprolol increased to 5 mg daily, referral placed to HCM clinic, need for primary prevention ICD to be reassess following HCM clinic evaluation    Evaluated by Dr. Davis at the HCM clinic 02/19/2024 - risk of SCD from HCM suspected to be low and thus does not believe the patient warrants ICD implantation at this time.  Patient is feeling improved on bisoprolol   Patient is attempting to get into cardiac rehabilitation   Patient expressed concern about the amount of SVT runs- reassured that many of the rhythms were short runs of sinus tachycardia.   F/U 3 months     2) HTN  On bisoprolol 2.5 mg daily  Also manages her BP through conservative management with salt restriction   BP has stabilized on bisoprolol   F/U 3 months     3) Hyperlipidemia  Not currently on statin therapy as she wishes to continue conservative management   CTA coronary arteries with mild calcified atherosclerotic disease LAD with mild stenosis, Mild luminal stenosis (25 to 49%), mild non-obstructive CAD, moderate asymmetric hypertrophy of the left ventricle (max dimension 1.6 cm).  Provided the patient with information re: Mediterranean style diet   F/U 3 months         Scribe Attestation  By signing my name below, I, Seb Thomas   attest that this documentation has been prepared under the direction and in the presence of Naveed Mathis MD.

## 2024-02-22 ENCOUNTER — OFFICE VISIT (OUTPATIENT)
Dept: CARDIOLOGY | Facility: CLINIC | Age: 69
End: 2024-02-22
Payer: MEDICARE

## 2024-02-22 VITALS
HEIGHT: 62 IN | SYSTOLIC BLOOD PRESSURE: 128 MMHG | WEIGHT: 198 LBS | HEART RATE: 78 BPM | BODY MASS INDEX: 36.44 KG/M2 | DIASTOLIC BLOOD PRESSURE: 78 MMHG

## 2024-02-22 DIAGNOSIS — I42.1 HOCM (HYPERTROPHIC OBSTRUCTIVE CARDIOMYOPATHY) (MULTI): ICD-10-CM

## 2024-02-22 PROCEDURE — 99213 OFFICE O/P EST LOW 20 MIN: CPT | Performed by: INTERNAL MEDICINE

## 2024-02-22 PROCEDURE — 1159F MED LIST DOCD IN RCRD: CPT | Performed by: INTERNAL MEDICINE

## 2024-02-22 PROCEDURE — 1126F AMNT PAIN NOTED NONE PRSNT: CPT | Performed by: INTERNAL MEDICINE

## 2024-02-22 PROCEDURE — 1036F TOBACCO NON-USER: CPT | Performed by: INTERNAL MEDICINE

## 2024-02-22 PROCEDURE — 1160F RVW MEDS BY RX/DR IN RCRD: CPT | Performed by: INTERNAL MEDICINE

## 2024-02-22 ASSESSMENT — ENCOUNTER SYMPTOMS: PALPITATIONS: 1

## 2024-02-22 NOTE — PATIENT INSTRUCTIONS
Continue all current medications as prescribed.   Followup with Dr. Mathis in 3 months.    If you have any questions or cardiac concerns, please call our office at [phone].

## 2024-02-26 LAB
ATRIAL RATE: 66 BPM
P AXIS: 31 DEGREES
P OFFSET: 198 MS
P ONSET: 142 MS
PR INTERVAL: 158 MS
Q ONSET: 221 MS
QRS COUNT: 11 BEATS
QRS DURATION: 86 MS
QT INTERVAL: 408 MS
QTC CALCULATION(BAZETT): 427 MS
QTC FREDERICIA: 421 MS
R AXIS: 42 DEGREES
T AXIS: 43 DEGREES
T OFFSET: 425 MS
VENTRICULAR RATE: 66 BPM

## 2024-02-28 ENCOUNTER — E-VISIT (OUTPATIENT)
Dept: CARDIOLOGY | Facility: CLINIC | Age: 69
End: 2024-02-28
Payer: MEDICARE

## 2024-03-05 NOTE — PROGRESS NOTES
"[facility] [place]   [facility]omyopathy [facility]    Primary Care Physician: Norris Pack DO  Primary Cardiologist:  Dr. Naveed Mathis and Dr. Alek Hendrix  Date of Visit: 2024 10:40 AM EST     HPI / Summary:   Catrachita Cantu is a 68 y.o. female with a history of hypertrophic obstructive cardiomyopathy, HTN and non obstructive coronary artery disease who presents for follow up of hypertrophic cardiomyopathy.    After the last visit, she had planned to do cardiac rehab, but it was not covered by Medicare because her EF is preserved and she has not had a septal myectomy. She presents for exercise recommendations in the setting of her obstructive HCM. She currently enjoys doing aerobic interval and strength training.     HCM History  The patient was initially diagnosed in  after evaluation at [facility].  Family History of HCM: None  NYHA Class: III  Wall thickness: 15 mm on CMR  EF: 74% on CMR  LVOT obstruction: has a history of obstruction  ICD or PPM: none  Prior septal reduction therapy: none  Genetic Testin2023 negative ([facility])  Parents: Mother  suddenly at age 49. Her mother smoked and said \"I don't feel well\" one week prior to dying. Maternal Grandfather  at age 60.   Children: 1 daughter Kori (b ). Good health.   Siblings: 1 brother, David (b ) - reportedly has been screened.     Sudden Cardiac Arrest Risk Factors:   No syncope  No wall thickness above 30 mm.   No apical aneurysm  No NSVT by ambulatory monitor  No EF less than 50%  No LGE by CMR  No family history of SCA in a family member with HCM. Mother  at age 49 but it is unclear if she had HCM.   SCA risk: low, especially given age.     ROS: Relevant review of symptoms of negative unless discussed above.     Problems:   Patient Active Problem List   Diagnosis    Ventral hernia without obstruction or gangrene    Umbilical hernia without obstruction and " "without gangrene       Medical History:   No past medical history on file.    Surgical Hx:   No past surgical history on file.     Family Hx:   No family history on file.     Social Hx:  Occupation/School: Retired     Medications  Current Outpatient Medications   Medication Instructions    bisoprolol (ZEBETA) 2.5 mg, oral, Daily    FLUoxetine (PROZAC) 20 mg, oral, Daily RT    LORazepam (Ativan) 0.5 mg tablet        Allergies  Gentamicin    Exam:   Vitals: LMP  (LMP Unknown)   Wt Readings from Last 5 Encounters:   02/22/24 89.8 kg (198 lb)   02/05/24 89.8 kg (198 lb)   01/29/24 92.1 kg (203 lb)   01/09/24 92.1 kg (203 lb)     Televisit     Labs:     Hemoglobin A1C  Lab Results   Component Value Date    HGBA1C 5.5 01/08/2024       BMP  No results found for: \"GLUCOSE\", \"CALCIUM\", \"NA\", \"K\", \"CO2\", \"CL\", \"BUN\", \"CREATININE\"      Notable Studies: imaging personally reviewed and summarized by me below  EKG:  -2/19/2024: NSR with sinus arrhythmia, HR 66, Qtc 427. No TWI.     Echo:  -2/23/2023 ([facility]): LVEF 58%, GLS -20%, normal RV size and function.  LVOT gradient at rest is 8 mmHg at heart rate of 65 bpm with mild TOMAS.  With Valsalva gradient remains unchanged but intracavitary gradient of 22 mmHg develops.  No LVOT gradient with amyl nitrate.    Ambulatory Rhythm Monitor:   -Zio patch: 13 days 12/30/2023 - 1/13/2024: Minimum heart rate 46, maximum heart rate 207, average heart rate 74.  8757 SVT events with the fastest interval lasting 10 seconds with a maximum rate of 270/min.  The longest lasted 32 seconds with an average rate of 115 bpm.  Ventricular ectopy was rare. **However, Dr. Hendrix reports only rare SVT and thinks the high SVT burden was a typo**.     Cardiac MRI:  -1/2024: LVEF 74%, isolated basal septal hypertrophy with maximum thickness 1.5 cm.  No LV apical aneurysm and no LGE.  Mild to moderate mitral regurgitation  -5/15/2020 ([facility]): Mild asymmetric LVH involving the basal anteroseptum (1.2 cm): " "Multifocal delayed enhancement at the RV insertion point.    Cardiac CT:  -CT coronary: 12/26/2023: Mild nonobstructive coronary disease in the LAD.    Stress Test:  -12/20/2023: [facility], 5:08 of Valdemar protocol, 86% of max predicted heart rate, 5.9 METS, LVEF 64%, no TOMAS at rest.  LVOT gradient at rest 9 mmHg.  Exercise showed 1 episode of SVT during recovery with a heart rate of 160 bpm.  There was TOMAS with septal contact and LVOT peak gradient of 39 mmHg no change in mitral regurgitation.  -2/5/2021: 89% of max predicted heart rate, at rest there is mild TOMAS with an LVOT gradient less than 10 mmHg with exercise there is severe TOMAS and LVOT gradient of 100 mmHg.      Assessment and Plan  Catrachita Cantu is a 68 y.o. female with a history of hypertrophic obstructive cardiomyopathy, HTN and non obstructive coronary artery disease who presents for evaluation of hypertrophic cardiomyopathy.    On 3/7/2024 she specifically wanted to discuss exercise recommendations.     There is debate regarding exercising with hypertrophic cardiomyopathy due to its association with sudden death, in both athletes and non athletes. For that reason, it was historically advised that people with HCM not exercise. Recent data suggest that the risk of sudden death with exercise is lower than previously thought, though not \"no risk\". There is heterogeneity among the presentations of HCM and for this reason, the guidelines note that the exact risk for each person for each activity is unknown.  Guidelines recommend risk stratification by an expert in HCM and shared decision making with patients regarding the goals of exercise and the risks and benefits of exercise. It is important to note that sudden cardiac arrest/death may happen even in individuals with no classic risk factors. Recent data from the RESET-HCM trial suggest that moderate intensity interval training is safe and effective in individuals with HCM. The LIVE-HCM trial, which " "was published in 2023, suggests that vigorous exercise is not associated with a higher rate of adverse events than low or moderate intensity exercise. The European guidelines on exercising with HCM (2020) and the ACC/AHA HCM guidelines (2020) have evolved. Mild-moderate intensity exercise is now considered beneficial for most people and recommended for at least 150 mins per week (divided over 5 sessions) for general health improvement purposes. Certain individuals, after risk stratification can participate in intense physical activity and competition. For those who have been sedentary, gradual increase in the length and intensity of exercise is recommended. A 5 minute warm up and cool down period are always recommended as is maintaining adequate hydration, avoiding extreme conditions (extreme cold or heat), continuing routine medication adherence, and not exercising while ill. As a safety precaution, competitive and/or vigorous exercise should be done with other individuals in the area and preferably in a location where an external defibrillator is accessible.     Exercise goals: lose weight and stay fit  Classic Risk Factors: None  Protective factors:  Age  Recommendation:     For Catrachita, we felt as though the RESET-HCM protocol would be appropriate. She can also utilize the \"talk test\"; exercise at a level where it is still comfortable to talk but too intense to sing.     Personalized exercise prescription to mimic RESET-HCM protocol. Maintain HR up to the specific HR.  Resting HR: 68, Maximum . HR reserve is 61 bpm.   Week 1: 3 sessions x 20 mins each. HRR 60% which is 104 bpm  Week 2: 3 sessions x 25 mins each. HRR 60% which is 104 bpm  Week 3: 3 sessions x 30 mins each. HRR 60% which is 104 bpm.   Week 4: 3 sessions x 35 mins each. HRR 60% which is 104 bpm.   Month 2: 4 sessions x 35 mins each. HRR 60% which is 104 bpm.   Month 3: 4 sessions x 40 mins each HRR 60% which is 104 bpm  Month 4: 4 sessions x " "40 mins each HRR 70% which is 110 bpm    For weight trainin-8 repetitions and 3 sets. The weight should be low enough that the first repetition feels similarly hard to the last repetition. Avoid trying to \"max out\" the weight.     She will follow up for HCM care in 3 months and continue to follow routinely with her cardiologist Dr. Mathis and EP Dr. Hendrix.     Burt Davis MD  Director, Sports Cardiology  [facility]    Part of this note was completed using dictation and voice recognition software. Please excuse minor errors and typos.     Time Spent: I spent 32 minutes reviewing medical testing, obtaining medical history and counselling and educating on diagnosis and documenting clinical encounter.   "

## 2024-03-07 ENCOUNTER — TELEMEDICINE (OUTPATIENT)
Dept: CARDIOLOGY | Facility: CLINIC | Age: 69
End: 2024-03-07
Payer: MEDICARE

## 2024-03-07 DIAGNOSIS — I42.1 HOCM (HYPERTROPHIC OBSTRUCTIVE CARDIOMYOPATHY) (MULTI): Primary | ICD-10-CM

## 2024-03-07 PROCEDURE — 1159F MED LIST DOCD IN RCRD: CPT | Performed by: INTERNAL MEDICINE

## 2024-03-07 PROCEDURE — 1160F RVW MEDS BY RX/DR IN RCRD: CPT | Performed by: INTERNAL MEDICINE

## 2024-03-07 PROCEDURE — 1126F AMNT PAIN NOTED NONE PRSNT: CPT | Performed by: INTERNAL MEDICINE

## 2024-03-07 PROCEDURE — 99214 OFFICE O/P EST MOD 30 MIN: CPT | Performed by: INTERNAL MEDICINE

## 2024-03-07 PROCEDURE — 1036F TOBACCO NON-USER: CPT | Performed by: INTERNAL MEDICINE

## 2024-03-07 NOTE — Clinical Note
She presented for specific exercise recommendations in the context of her known HCM. She is overall doing pretty well.

## 2024-03-29 DIAGNOSIS — E66.9 CLASS 2 OBESITY IN ADULT, UNSPECIFIED BMI, UNSPECIFIED OBESITY TYPE, UNSPECIFIED WHETHER SERIOUS COMORBIDITY PRESENT: Primary | ICD-10-CM

## 2024-04-23 PROBLEM — H53.2 DIPLOPIA: Status: ACTIVE | Noted: 2019-05-10

## 2024-04-23 PROBLEM — E66.9 OBESITY, CLASS II, BMI 35-39.9: Status: ACTIVE | Noted: 2023-12-14

## 2024-04-23 PROBLEM — R14.3 FLATULENCE: Status: ACTIVE | Noted: 2017-10-31

## 2024-04-23 PROBLEM — R00.2 PALPITATIONS: Status: ACTIVE | Noted: 2024-04-23

## 2024-04-23 PROBLEM — E88.810 METABOLIC SYNDROME: Status: ACTIVE | Noted: 2017-10-31

## 2024-04-23 PROBLEM — I10 ESSENTIAL HYPERTENSION: Status: ACTIVE | Noted: 2017-10-31

## 2024-04-23 PROBLEM — F32.A ANXIETY AND DEPRESSION: Status: ACTIVE | Noted: 2017-10-31

## 2024-04-23 PROBLEM — H50.012 MONOCULAR ESOTROPIA, LEFT EYE: Status: ACTIVE | Noted: 2019-05-10

## 2024-04-23 PROBLEM — E27.9 DISORDER OF ADRENAL GLAND (MULTI): Status: ACTIVE | Noted: 2023-09-22

## 2024-04-23 PROBLEM — H52.4 PRESBYOPIA: Status: ACTIVE | Noted: 2022-04-11

## 2024-04-23 PROBLEM — H52.13 MYOPIA OF BOTH EYES: Status: ACTIVE | Noted: 2022-04-11

## 2024-04-23 PROBLEM — C4A.9 MERKEL CELL CANCER (MULTI): Status: ACTIVE | Noted: 2017-10-31

## 2024-04-23 PROBLEM — R06.09 DYSPNEA ON EXERTION: Status: ACTIVE | Noted: 2023-12-14

## 2024-04-23 PROBLEM — I47.10 SUPRAVENTRICULAR TACHYCARDIA (CMS-HCC): Status: ACTIVE | Noted: 2024-04-23

## 2024-04-23 PROBLEM — F41.9 ANXIETY AND DEPRESSION: Status: ACTIVE | Noted: 2017-10-31

## 2024-04-23 PROBLEM — E78.5 DYSLIPIDEMIA: Status: ACTIVE | Noted: 2023-12-14

## 2024-04-23 PROBLEM — H51.8 DIVERGENCE INSUFFICIENCY: Status: ACTIVE | Noted: 2022-04-11

## 2024-04-23 PROBLEM — I42.1 HYPERTROPHIC OBSTRUCTIVE CARDIOMYOPATHY (MULTI): Status: ACTIVE | Noted: 2023-12-14

## 2024-04-23 PROBLEM — H10.9 BACTERIAL CONJUNCTIVITIS OF RIGHT EYE: Status: ACTIVE | Noted: 2022-06-09

## 2024-04-23 PROBLEM — E66.812 OBESITY, CLASS II, BMI 35-39.9: Status: ACTIVE | Noted: 2023-12-14

## 2024-04-23 PROBLEM — R60.0 SWELLING OF SUBMANDIBULAR GLAND: Status: ACTIVE | Noted: 2024-04-23

## 2024-04-23 PROBLEM — R94.31 ABNORMAL ELECTROCARDIOGRAPHY: Status: ACTIVE | Noted: 2023-10-23

## 2024-04-23 PROBLEM — Z85.821 HISTORY OF MERKEL CELL CARCINOMA: Status: ACTIVE | Noted: 2018-04-26

## 2024-04-23 RX ORDER — METOPROLOL SUCCINATE 25 MG/1
25 TABLET, EXTENDED RELEASE ORAL DAILY
COMMUNITY
Start: 2024-03-24

## 2024-05-27 ENCOUNTER — APPOINTMENT (OUTPATIENT)
Dept: CARDIOLOGY | Facility: CLINIC | Age: 69
End: 2024-05-27
Payer: MEDICARE

## 2024-06-13 ENCOUNTER — APPOINTMENT (OUTPATIENT)
Dept: CARDIOLOGY | Facility: CLINIC | Age: 69
End: 2024-06-13
Payer: MEDICARE

## 2024-06-17 ENCOUNTER — APPOINTMENT (OUTPATIENT)
Dept: CARDIOLOGY | Facility: CLINIC | Age: 69
End: 2024-06-17
Payer: MEDICARE

## 2024-06-20 ENCOUNTER — APPOINTMENT (OUTPATIENT)
Dept: CARDIOLOGY | Facility: CLINIC | Age: 69
End: 2024-06-20
Payer: MEDICARE

## 2024-06-21 ENCOUNTER — APPOINTMENT (OUTPATIENT)
Dept: CARDIOLOGY | Facility: CLINIC | Age: 69
End: 2024-06-21
Payer: MEDICARE

## 2024-06-21 VITALS
OXYGEN SATURATION: 96 % | WEIGHT: 197 LBS | HEART RATE: 76 BPM | SYSTOLIC BLOOD PRESSURE: 130 MMHG | HEIGHT: 62 IN | DIASTOLIC BLOOD PRESSURE: 80 MMHG | BODY MASS INDEX: 36.25 KG/M2

## 2024-06-21 DIAGNOSIS — I42.1 HOCM (HYPERTROPHIC OBSTRUCTIVE CARDIOMYOPATHY) (MULTI): ICD-10-CM

## 2024-06-21 DIAGNOSIS — E78.5 DYSLIPIDEMIA: Primary | ICD-10-CM

## 2024-06-21 PROCEDURE — 3075F SYST BP GE 130 - 139MM HG: CPT | Performed by: INTERNAL MEDICINE

## 2024-06-21 PROCEDURE — 1160F RVW MEDS BY RX/DR IN RCRD: CPT | Performed by: INTERNAL MEDICINE

## 2024-06-21 PROCEDURE — 1159F MED LIST DOCD IN RCRD: CPT | Performed by: INTERNAL MEDICINE

## 2024-06-21 PROCEDURE — 3079F DIAST BP 80-89 MM HG: CPT | Performed by: INTERNAL MEDICINE

## 2024-06-21 PROCEDURE — 99213 OFFICE O/P EST LOW 20 MIN: CPT | Performed by: INTERNAL MEDICINE

## 2024-06-21 RX ORDER — BISOPROLOL FUMARATE 5 MG/1
2.5 TABLET, FILM COATED ORAL DAILY
Qty: 45 TABLET | Refills: 3 | Status: SHIPPED | OUTPATIENT
Start: 2024-06-21 | End: 2025-06-21

## 2024-06-21 NOTE — PATIENT INSTRUCTIONS
Continue all current medications as prescribed. Refills for bisoprolol have been sent to your pharmacy.  Dr. Mathis has recommended that you discuss Wegovy with an endocrinologist as this medication comes with potential side effects of gastric dysmotility and neuroendocrine tumors and patients should have close followup if started on this medication.   Please have blood work drawn to followup on your cholesterol (fasting). If these values continue to be elevated, Dr. Mathis has recommended starting a statin. He has reassured you today that the benefits of lowering your cholesterol with statins outweigh the potential side effects, and there are alternatives to statins if you develop any side effects with statins.   Followup with Dr. Mathis in 6 months.    If you have any questions or cardiac concerns, please call our office at [phone].

## 2024-06-21 NOTE — PROGRESS NOTES
"Counseling:  The patient was counseled regarding diagnostic results, instructions for management, risk factor reductions, prognosis, patient and family education, impressions, risks and benefits of treatment options and importance of compliance with treatment.      Chief Complaint:  The patient presents today for 4-month followup of HOCM, HTN and palpitations.    History Of Present Illness:    Catrachita Cantu is a 68 y.o. female patient who presents today for 4-month followup of HOCM, HTN and palpitations. Her PMH is significant for HOCM, HTN, dyslipidemia, ventral and umbilical hernia, anxiety, borderline diabetes, depression, Merkel cell carcinoma s/p surgical removal 2016, mitral insufficiency, and SVT. Over the past 4 months, the patient states that she has done well from a cardiac standpoint. She denies any CP, chest discomfort or SOB. She reports having only 1 episode of SVT while attending the gym which was short lasting and resolved with rest. The patient inquired about the use of Wegovy for weight loss in light of its cardiovascular benefits. The patient indicates that she had a sleep study done that showed mild sleep apnea. BP has been stable at home. The patient is compliant with her prescribed medications.     Past Surgical History:  She has no past surgical history on file.      Social History:  She reports that she has never smoked. She has never used smokeless tobacco. She reports that she does not currently use alcohol. She reports current drug use.    Family History:  No family history on file.     Allergies:  Gentamicin    Outpatient Medications:  Current Outpatient Medications   Medication Instructions    bisoprolol (ZEBETA) 2.5 mg, oral, Daily    FLUoxetine (PROZAC) 20 mg, oral, Daily RT        Last Recorded Vitals:  Vitals:    06/21/24 1447 06/21/24 1525   BP: 142/90 130/80   BP Location: Left arm    Pulse: 76    SpO2: 96%    Weight: 89.4 kg (197 lb)    Height: 1.575 m (5' 2\")        Review of " Systems   All other systems reviewed and are negative.     Physical Exam:  Constitutional:       Appearance: Healthy appearance. Not in distress.   Neck:      Vascular: No JVR. JVD normal.   Pulmonary:      Effort: Pulmonary effort is normal.      Breath sounds: Normal breath sounds. No wheezing. No rhonchi. No rales.   Chest:      Chest wall: Not tender to palpatation.   Cardiovascular:      PMI at left midclavicular line. Normal rate. Regular rhythm. Normal S1. Normal S2.       Murmurs: There is no murmur.      No gallop.  No click. No rub.   Pulses:     Intact distal pulses.   Edema:     Peripheral edema absent.   Abdominal:      General: Bowel sounds are normal.      Palpations: Abdomen is soft.      Tenderness: There is no abdominal tenderness.   Musculoskeletal: Normal range of motion.         General: No tenderness. Skin:     General: Skin is warm and dry.   Neurological:      General: No focal deficit present.      Mental Status: Alert and oriented to person, place and time.        Last Labs:  Lab Results   Component Value Date    HGBA1C 5.5 01/08/2024       Last Cardiology Tests:  01/29/2024 - Cardiac MRI  1.  Normal left ventricular cavity size with hyperdynamic systolic function. Ejection fraction 74%.  2.  Basal asymmetric septal left ventricular hypertrophy. Basal anterior septum 1.5 cm. No evidence of left ventricular apical aneurysm.  3.  Delayed enhancement imaging is normal. No evidence of fibrosis, infiltrative disease, previous myocardial infarction, or inflammation of the left ventricular or right ventricular myocardium.  4. Normal right ventricular cavity size with preserved systolic function. RV EF 58%.  5. Thickened mitral valve leaflets with mild-moderate mitral valve regurgitation.    12/30/2023 to 01/13/2024 - [place] Monitor   1. Predominant underlying rhythm was sinus rhythm; min HR 46 bpm, max  bpm, avg HR 74 bpm.  2. 8757 supraventricular tachycardia runs occurred; fastest interval  lasting 9.9 seconds with max rate 207 bpm, longest lasting 32 seconds with avg rate 115 bpm.  3. Isolated SVEs were rare, SVE couplets were rare, and SVE triplets were rare.  4. Isolated VEs were rare, VE couplets were rare, and VE triplets were rare.  5. Ventricular bigeminy and trigeminy were present.     12/26/2023 - CTA Coronary Arteries  1.  Mild calcified atherosclerotic disease in the LEFT anterior descending artery with mild stenosis. CAD-RAD 2: Mild luminal stenosis (25 to 49%). Mild non-obstructive CAD.  Overall Plaque Ava: P1: mild amount of plaque   2.  Moderate asymmetric hypertrophy of the LEFT ventricle (max dimension 1.6 cm).     02/23/2023 - TTE  1. The left ventricle is normal in size. There is moderate septal left ventricular hypertrophy. Left ventricular systolic function is normal. EF = 58 ± 5% (2D biplane) Grade I left ventricular diastolic dysfunction. GLS normal, -20.4%.   2. The right ventricle is normal in size. Right ventricular systolic function is normal.   3. The left atrial cavity is mildly dilated.   4. At rest LVOT gradient = 8mmHg (65bpm). Mild SHAHRIAR with 1-2+ MR. With valsalva LVOT gradient remains unchanged however an intracavitary gradient = 22mmHg develops. With amyl nitrite LVOT gradient = 10mmHg (55bpm). No change in SHAHRIAR or MR.   5. Exam was compared with the prior  echocardiographic exam performed on 2/5/2021. Similar resting LVOT gradients. Prior study was an exercise study with post exercise LVOT gradient of 100mmHg.     Assessment/Plan   1) HOCM, SVT  On bisoprolol 2.5 mg daily   Metoprolol previously discontinued   [facility] Abraham [facility]  TTE 02/2020 with LVEF 76%, mild SHAHRIAR   Stress echo 02/2021 with 6.1 METS, normal BP response, minimal resting LVOT gradient, mild Shahriar, significant exercise gradient of 100 mmHg with severe SHAHRIAR  Zio patch monitor 03/2021 without significant ventricular arrhythmias  Cardiac MRI 5/2020 with mild asymmetric  septal hypertrophy, mild TOMAS, mild flow acceleration, mild focal delayed enhancement at RV insertion point in mid inferoseptum  TTE 02/2023 with LVEF 58 ± 5% , grade I LV diastolic dysfunction, rest LVOT gradient of 8mmHg (65bpm), mild TOMAS, 1-2+ MR, LVOT unchanged with Valsalva but with developing intracavitary gradient of 22 mmHg, LVOT gradient of 10 mmHg with amyl nitrite with no change in TOMAS or MR.  CTA coronary arteries with mild calcified atherosclerotic disease LAD with mild stenosis, Mild luminal stenosis (25 to 49%), mild non-obstructive CAD, moderate asymmetric hypertrophy of the left ventricle (max dimension 1.6 cm).  Cardiac MRI 01/29/2024 with normal LV size and function (74%), basal asymmetric septal LVH (basal anterior septum 1.5 cm), normal delayed enhancement imaging, normal RV size and function (58%), thickened mitral valve leaflets with mild-moderate mitral regurgitation.  Zio patch monitor 12/30/2023 to 01/13/2024 with over 8000 runs of SVT  Subsequently referred to Dr. Hendrix, seen 02/05/2024 - bisoprolol increased to 5 mg daily, referral placed to HCM clinic, need for primary prevention ICD to be reassess following HCM clinic evaluation    Evaluated by Dr. Davis at the HCM clinic 02/19/2024 - risk of SCD from HCM suspected to be low and thus does not believe the patient warrants ICD implantation at this time.  Patient expressed concern about the amount of SVT runs- reassured that many of the rhythms were short runs of sinus tachycardia.   Denies CP, chest discomfort or SOB  Reports 1 episode of SVT while attending the gym- short lasting, resolved with rest  Continue current medical Rx  F/U 6 months    2) HTN  Stable   On bisoprolol 2.5 mg daily  Also manages her BP through conservative management with salt restriction   Continue current medical Rx  F/U 6 months    3) Hyperlipidemia  CTA coronary arteries with mild calcified atherosclerotic disease LAD with mild stenosis, Mild luminal  stenosis (25 to 49%), mild non-obstructive CAD, moderate asymmetric hypertrophy of the left ventricle (max dimension 1.6 cm).  Provided the patient with information re: Mediterranean style diet   Lipid panel 03/10/2023 with total cholesterol and LDL of 226 and 137 respectively   NMR lipoprotein profile 10/27/2023 with total cholesterol of 219, LDL of 135, LDL-P of 2309, LDL size 20.4, small LDL-P >1152   Lipoprotein A 01/08/2024 of 25.7  Patient is hesitant to start statin therapy s/t side effects   Per the patient, she closely monitors her diet and is taking natural supplements   Advised on LDL goal of <70  Repeat NMR lipoprotein profile   Discussed starting statin therapy if values remain elevated  Discussed alternatives to statins such as PCSK9 inhibitors   F/U 6 months    4) Weight Management   Patient inquired about the use of Wegovy for weight loss in light of potential cardiovascular benefits   Advised that this medication should only be prescribed under the guidance of an endocrinologist as it does come with side effects such as gastric dysmotolity and neuroendocrine tumors.  Offered referral to bariatrics, but patient declines at this time      5) Sleep Apnea  Recent sleep study positive for mild HODA, per patient   Will be following up with specialist         Scribe Attestation  By signing my name below, I, Seb Thomas   attest that this documentation has been prepared under the direction and in the presence of Naveed Mathis MD.

## 2024-06-27 ENCOUNTER — APPOINTMENT (OUTPATIENT)
Dept: CARDIOLOGY | Facility: HOSPITAL | Age: 69
End: 2024-06-27
Payer: MEDICARE

## 2024-06-27 ENCOUNTER — APPOINTMENT (OUTPATIENT)
Dept: CARDIOLOGY | Facility: CLINIC | Age: 69
End: 2024-06-27
Payer: MEDICARE

## 2024-07-11 ENCOUNTER — APPOINTMENT (OUTPATIENT)
Dept: CARDIOLOGY | Facility: CLINIC | Age: 69
End: 2024-07-11
Payer: MEDICARE

## 2024-07-24 NOTE — PROGRESS NOTES
"[facility] [place]   [facility]omy[facility]    Primary Care Physician: Norris Pack DO  Primary Cardiologist:  Dr. Naveed Mathis and Dr. Alek Hendrix  Date of Visit: 2024  4:00 PM EDT     HPI / Summary:   Catrachita Cantu is a 68 y.o. female with a history of hypertrophic obstructive cardiomyopathy, HTN and non obstructive coronary artery disease who presents for follow up of hypertrophic cardiomyopathy.    Has been doing the interval training per the RESET-HCM protocol that we designed last visit. Getting her HR up to 104 bpm, which is about 7/10 in intensity. She completed an echocardiogram and stress echocardiogram on  which did not show obstruction and showed average exercise capacity. She is very interested in losing weight and is considering intermittent fasting. She is able to work out 3 days per week including an aerobics class.     HCM History  The patient was initially diagnosed in  after evaluation at [facility].  Family History of HCM: None  NYHA Class: II  Wall thickness: 15 mm on CMR  EF: 74% on CMR  LVOT obstruction: has a history of obstruction  ICD or PPM: none  Prior septal reduction therapy: none  Genetic Testin2023 negative ([facility])  Parents: Mother  suddenly at age 49. Her mother smoked and said \"I don't feel well\" one week prior to dying. Maternal Grandfather  at age 60.   Children: 1 daughter Kori (b ). Good health.   Siblings: 1 brother, David (b ) - reportedly has been screened.     Sudden Cardiac Arrest Risk Factors:   No syncope  No wall thickness above 30 mm.   No apical aneurysm  No NSVT by ambulatory monitor  No EF less than 50%  No LGE by CMR  No family history of SCA in a family member with HCM. Mother  at age 49 but it is unclear if she had HCM.   SCA risk: low, especially given age.     ROS: Relevant review of symptoms of negative unless discussed above.     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    Surgical Hx:   No past surgical history on file.     Family Hx:   No family history on file.     Social Hx:  Occupation/School: Retired     Medications  Current Outpatient Medications   Medication Instructions    bisoprolol (ZEBETA) 2.5 mg, oral, Daily    FLUoxetine (PROZAC) 40 mg, oral, Daily       Allergies  Gentamicin    Exam:   Vitals: Pulse 76   Ht 1.575 m (5' 2\")   LMP  (LMP Unknown)   SpO2 95%   BMI 36.03 kg/m²   Wt Readings from Last 5 Encounters:   06/21/24 89.4 kg (197 lb)   02/22/24 89.8 kg (198 lb)   02/05/24 89.8 kg (198 lb)   01/29/24 92.1 kg (203 lb)   01/09/24 92.1 kg (203 lb)     Physical exam: Well appearing, no distress. Normal rate, regular rhythm, non labored breathing, clear to auscultation, abdomen non distended, no LE edema, no focal neuro deficits.        Labs:     Hemoglobin A1C  Lab Results   Component Value Date    HGBA1C 5.5 01/08/2024       BMP  No results found for: \"GLUCOSE\", \"CALCIUM\", \"NA\", \"K\", \"CO2\", \"CL\", \"BUN\", \"CREATININE\"      Notable Studies: imaging personally reviewed and summarized by me below  EKG:  -2/19/2024: " NSR with sinus arrhythmia, HR 66, Qtc 427. No TWI.     Echo:  -2/23/2023 ([facility]): LVEF 58%, GLS -20%, normal RV size and function.  LVOT gradient at rest is 8 mmHg at heart rate of 65 bpm with mild TOMAS.  With Valsalva gradient remains unchanged but intracavitary gradient of 22 mmHg develops.  No LVOT gradient with amyl nitrate.  -7/25/24: LVEF 60-65%, maximal LVOT gradient 15 mmHg with Valsalva, maximal wall thickness 1.6 cm    Ambulatory Rhythm Monitor:   -Zio patch: 13 days 12/30/2023 - 1/13/2024: Minimum heart rate 46, maximum heart rate 207, average heart rate 74.  8757 SVT events with the fastest interval lasting 10 seconds with a maximum rate of 270/min.  The longest lasted 32 seconds with an average rate of 115 bpm.  Ventricular ectopy was rare. **However, Dr. Hendrix reports only rare SVT and thinks the high SVT burden was a typo**.     Cardiac MRI:  -1/2024: LVEF 74%, isolated basal septal hypertrophy with maximum thickness 1.5 cm.  No LV apical aneurysm and no LGE.  Mild to moderate mitral regurgitation  -5/15/2020 ([facility]): Mild asymmetric LVH involving the basal anteroseptum (1.2 cm): Multifocal delayed enhancement at the RV insertion point.    Cardiac CT:  -CT coronary: 12/26/2023: Mild nonobstructive coronary disease in the LAD.    Stress Test:  -7/25/24: Valdemar protocol 5:55, peak  bpm, max wall thickness 1.6 cm max LVOT gradient post exercise is ~27 mmHg. No ischemia or arrhythmias during stress. 5 seconds of SVT in recovery.   -12/20/2023: [facility], 5:08 of Valdemar protocol, 86% of max predicted heart rate, 5.9 METS, LVEF 64%, no TOMAS at rest.  LVOT gradient at rest 9 mmHg.  Exercise showed 1 episode of SVT during recovery with a heart rate of 160 bpm.  There was TOMAS with septal contact and LVOT peak gradient of 39 mmHg no change in mitral regurgitation.  -2/5/2021: 89% of max predicted heart rate, at rest there is mild TOMAS with an LVOT gradient less than 10 mmHg with exercise there is severe  TOMAS and LVOT gradient of 100 mmHg.      Assessment and Plan  Catrachita Cantu is a 68 y.o. female with a history of hypertrophic obstructive cardiomyopathy, HTN and non obstructive coronary artery disease who presents for evaluation of hypertrophic cardiomyopathy.    Overall, she is doing very well.  Her initial visit was focused on exercise training and safety with HCM.  Since that time, she has been following the RESET HCM protocol that we have designed for her.  Her exercise echo on 7/25/2024 showed an average exercise capacity with no significant LVOT obstruction.  As such, it is safe for her to continue her exercise training, which is less intense than the exercise echo done on 7/25/2024.    Her risk for sudden cardiac arrest is relatively low and her symptoms, when present, are likely more due to diastolic dysfunction and deconditioning that obstruction.  She notes that her daughter and brother should be screened continuously.    She will follow up for HCM care in 12 months and continue to follow routinely with her cardiologist Dr. Mathis and EP Dr. Hendrix.     Burt Davis MD  Director, Sports Cardiology  Hy[facility]omyopathy [facility]    Part of this note was completed using dictation and voice recognition software. Please excuse minor errors and typos.     Time Spent: I spent 31 minutes reviewing medical testing, obtaining medical history and counselling and educating on diagnosis and documenting clinical encounter.

## 2024-07-25 ENCOUNTER — APPOINTMENT (OUTPATIENT)
Dept: CARDIOLOGY | Facility: CLINIC | Age: 69
End: 2024-07-25
Payer: MEDICARE

## 2024-07-25 ENCOUNTER — OFFICE VISIT (OUTPATIENT)
Dept: CARDIOLOGY | Facility: CLINIC | Age: 69
End: 2024-07-25
Payer: MEDICARE

## 2024-07-25 ENCOUNTER — HOSPITAL ENCOUNTER (OUTPATIENT)
Dept: CARDIOLOGY | Facility: CLINIC | Age: 69
Discharge: HOME | End: 2024-07-25
Payer: MEDICARE

## 2024-07-25 VITALS — HEIGHT: 62 IN | HEART RATE: 76 BPM | OXYGEN SATURATION: 95 % | BODY MASS INDEX: 36.03 KG/M2

## 2024-07-25 DIAGNOSIS — I42.1 HOCM (HYPERTROPHIC OBSTRUCTIVE CARDIOMYOPATHY) (MULTI): ICD-10-CM

## 2024-07-25 LAB
AORTIC VALVE PEAK VELOCITY: 1.8 M/S
AV PEAK GRADIENT: 13 MMHG
AVA (PEAK VEL): 3.05 CM2
EJECTION FRACTION APICAL 4 CHAMBER: 59.5
EJECTION FRACTION: 63 %
LEFT VENTRICLE INTERNAL DIMENSION DIASTOLE: 3.38 CM (ref 3.5–6)
LEFT VENTRICULAR OUTFLOW TRACT DIAMETER: 2.27 CM
MITRAL VALVE E/A RATIO: 0.73
RIGHT VENTRICLE FREE WALL PEAK S': 15 CM/S
RIGHT VENTRICLE PEAK SYSTOLIC PRESSURE: 22 MMHG
TRICUSPID ANNULAR PLANE SYSTOLIC EXCURSION: 1.8 CM

## 2024-07-25 PROCEDURE — 93350 STRESS TTE ONLY: CPT | Performed by: INTERNAL MEDICINE

## 2024-07-25 PROCEDURE — 93306 TTE W/DOPPLER COMPLETE: CPT | Performed by: INTERNAL MEDICINE

## 2024-07-25 PROCEDURE — 99214 OFFICE O/P EST MOD 30 MIN: CPT | Performed by: INTERNAL MEDICINE

## 2024-07-25 PROCEDURE — 93306 TTE W/DOPPLER COMPLETE: CPT

## 2024-07-25 PROCEDURE — 93325 DOPPLER ECHO COLOR FLOW MAPG: CPT | Performed by: INTERNAL MEDICINE

## 2024-07-25 PROCEDURE — 1126F AMNT PAIN NOTED NONE PRSNT: CPT | Performed by: INTERNAL MEDICINE

## 2024-07-25 PROCEDURE — 93325 DOPPLER ECHO COLOR FLOW MAPG: CPT

## 2024-07-25 PROCEDURE — 93016 CV STRESS TEST SUPVJ ONLY: CPT | Performed by: INTERNAL MEDICINE

## 2024-07-25 PROCEDURE — 93005 ELECTROCARDIOGRAM TRACING: CPT | Performed by: INTERNAL MEDICINE

## 2024-07-25 PROCEDURE — 93018 CV STRESS TEST I&R ONLY: CPT | Performed by: INTERNAL MEDICINE

## 2024-07-25 PROCEDURE — 1159F MED LIST DOCD IN RCRD: CPT | Performed by: INTERNAL MEDICINE

## 2024-07-25 ASSESSMENT — COLUMBIA-SUICIDE SEVERITY RATING SCALE - C-SSRS
1. IN THE PAST MONTH, HAVE YOU WISHED YOU WERE DEAD OR WISHED YOU COULD GO TO SLEEP AND NOT WAKE UP?: NO
2. HAVE YOU ACTUALLY HAD ANY THOUGHTS OF KILLING YOURSELF?: NO
6. HAVE YOU EVER DONE ANYTHING, STARTED TO DO ANYTHING, OR PREPARED TO DO ANYTHING TO END YOUR LIFE?: NO

## 2024-07-25 ASSESSMENT — PAIN SCALES - GENERAL: PAINLEVEL: 0-NO PAIN

## 2024-08-15 ENCOUNTER — TELEPHONE (OUTPATIENT)
Dept: CARDIOLOGY | Facility: HOSPITAL | Age: 69
End: 2024-08-15
Payer: MEDICARE

## 2024-08-15 DIAGNOSIS — E78.5 DYSLIPIDEMIA: Primary | ICD-10-CM

## 2024-08-15 NOTE — TELEPHONE ENCOUNTER
8/15/24  1240  Called pt at this time. Pt is refusing statins and repatha at this time. Pt states she wants to explore all options prior to starting any medications. States she has been working with the [facility] with exercise and diet. RN educated pt on having a high LDL. Pt wants to call back and after she talks to friends.     ----- Message from Naeved Mathis sent at 8/15/2024 12:22 PM EDT -----  She has not tolerated statins in past. May try Repatha  ----- Message -----  From: Alayna Manzo RN  Sent: 8/15/2024  10:05 AM EDT  To: Naveed Mathis MD    Hello Dr. Mathis,  Pt had lab results for CMP and Lipid scanned in from [place]. Looks like her LDL is 143 and not currently on a statin, looks like in the past on atorvastatin but discontinued.

## 2024-11-07 ENCOUNTER — TELEPHONE (OUTPATIENT)
Dept: CARDIOLOGY | Facility: HOSPITAL | Age: 69
End: 2024-11-07
Payer: MEDICARE

## 2024-12-10 ENCOUNTER — APPOINTMENT (OUTPATIENT)
Dept: CARDIOLOGY | Facility: HOSPITAL | Age: 69
End: 2024-12-10
Payer: MEDICARE

## 2024-12-10 VITALS
BODY MASS INDEX: 36.44 KG/M2 | WEIGHT: 198 LBS | HEART RATE: 71 BPM | OXYGEN SATURATION: 96 % | DIASTOLIC BLOOD PRESSURE: 88 MMHG | HEIGHT: 62 IN | SYSTOLIC BLOOD PRESSURE: 136 MMHG

## 2024-12-10 DIAGNOSIS — I42.1 HOCM (HYPERTROPHIC OBSTRUCTIVE CARDIOMYOPATHY) (MULTI): ICD-10-CM

## 2024-12-10 DIAGNOSIS — E78.5 DYSLIPIDEMIA: ICD-10-CM

## 2024-12-10 PROCEDURE — 93005 ELECTROCARDIOGRAM TRACING: CPT | Performed by: INTERNAL MEDICINE

## 2024-12-10 PROCEDURE — 99213 OFFICE O/P EST LOW 20 MIN: CPT | Performed by: INTERNAL MEDICINE

## 2024-12-10 PROCEDURE — 3079F DIAST BP 80-89 MM HG: CPT | Performed by: INTERNAL MEDICINE

## 2024-12-10 PROCEDURE — 1159F MED LIST DOCD IN RCRD: CPT | Performed by: INTERNAL MEDICINE

## 2024-12-10 PROCEDURE — 3008F BODY MASS INDEX DOCD: CPT | Performed by: INTERNAL MEDICINE

## 2024-12-10 PROCEDURE — 3075F SYST BP GE 130 - 139MM HG: CPT | Performed by: INTERNAL MEDICINE

## 2024-12-10 PROCEDURE — 1160F RVW MEDS BY RX/DR IN RCRD: CPT | Performed by: INTERNAL MEDICINE

## 2024-12-10 RX ORDER — BISOPROLOL FUMARATE 5 MG/1
2.5 TABLET, FILM COATED ORAL DAILY
Qty: 45 TABLET | Refills: 3 | Status: SHIPPED | OUTPATIENT
Start: 2024-12-10 | End: 2025-12-10

## 2024-12-10 NOTE — PATIENT INSTRUCTIONS
"Continue all current medications as prescribed. Refills have been sent to your pharmacy as per your request.   Your \"bad\" cholesterol is currently elevated at 112. We would like to see this number below 100. Increase your physical activity. Watch carbohydrate intake (rice, potatoes, pasta, bread, ice-cream all within moderation); increase greens, veggies, fiber, lean protein (fish, turkey, chicken; baked/boiled/grilled, not fried) and fruit.   Followup with Dr. Mathis in 1 year, sooner should any issues or concerns arise before then.     If you have any questions or cardiac concerns, please call our office at [phone].   "

## 2024-12-10 NOTE — LETTER
December 10, 2024     Norris Pack DO  [address] [place] [address]    Patient: Catrachita Cantu   YOB: 1955   Date of Visit: 12/10/2024       Dear Dr. Norris Pack DO:    Thank you for referring Catrachita Cantu to me for evaluation. Below are my notes for this consultation.  If you have questions, please do not hesitate to call me. I look forward to following your patient along with you.       Sincerely,     Naveed Mathis MD      CC: No Recipients  ______________________________________________________________________________________    Counseling:  The patient was counseled regarding diagnostic results, instructions for management, risk factor reductions, prognosis, patient and family education, impressions, risks and benefits of treatment options and importance of compliance with treatment.      Chief Complaint:  The patient presents today for 6-month followup of HOCM, HTN, SVT and hyperlipidemia.     History Of Present Illness:    Catrachita Cantu is a 69 y.o. female patient who presents today for 6-month followup of HOCM, HTN, SVT and hyperlipidemia. Her PMH is significant for HOCM, HTN, dyslipidemia, ventral and umbilical hernia, anxiety, borderline diabetes, depression, Merkel cell carcinoma s/p surgical removal 2016, mitral insufficiency, and SVT. Over the past 6 months, the patient states that she has done well from a cardiac standpoint. She denies any CP, chest discomfort or SOB. BP has been stable. The patient is compliant with her prescribed medications.     Past Surgical History:  She has no past surgical history on file.      Social History:  She reports that she has never smoked. She has never used smokeless tobacco. She reports that she does not currently use alcohol. She reports current drug use.    Family History:  No family history on file.     Allergies:  Gentamicin    Outpatient Medications:  Current Outpatient Medications   Medication Instructions   • bisoprolol  "(ZEBETA) 2.5 mg, oral, Daily   • FLUoxetine (PROZAC) 40 mg, Daily   • metFORMIN XR (GLUCOPHAGE-XR) 500 mg        Last Recorded Vitals:  Vitals:    12/10/24 1440   BP: 136/88   BP Location: Left arm   Pulse: 71   SpO2: 96%   Weight: 89.8 kg (198 lb)   Height: 1.575 m (5' 2\")       Review of Systems   All other systems reviewed and are negative.     Physical Exam:  Constitutional:       Appearance: Healthy appearance. Not in distress.   Neck:      Vascular: No JVR. JVD normal.   Pulmonary:      Effort: Pulmonary effort is normal.      Breath sounds: Normal breath sounds. No wheezing. No rhonchi. No rales.   Chest:      Chest wall: Not tender to palpatation.   Cardiovascular:      PMI at left midclavicular line. Normal rate. Regular rhythm. Normal S1. Normal S2.       Murmurs: There is no murmur.      No gallop.  No click. No rub.   Pulses:     Intact distal pulses.   Edema:     Peripheral edema absent.   Abdominal:      General: Bowel sounds are normal.      Palpations: Abdomen is soft.      Tenderness: There is no abdominal tenderness.   Musculoskeletal: Normal range of motion.         General: No tenderness. Skin:     General: Skin is warm and dry.   Neurological:      General: No focal deficit present.      Mental Status: Alert and oriented to person, place and time.        Last Labs:  Lab Results   Component Value Date    HGBA1C 5.5 01/08/2024       Last Cardiology Tests:  07/25/2024 - TTE  1. Left ventricular ejection fraction is normal, by visual estimate at 60-65%.  2. There is upper septal hypertrophy measuring 1.6 cm. There is an echo bright \"contact lesion\" at the basal septum suggestion prior TOMAS-septal contact. There is turbulence in the LVOT with mild TOMAS. LVOT gradient at rest is 8 mmHg. Peak LVOT gradient with Valsalva is 15 mmHg. Findings are consistent with hypertrophic cardiomyopathy without obstruction.  3. There is normal right ventricular global systolic function.  4. The left atrium is mildly " dilated.  5. RVSP within normal limits.  6. Normal aortic root.  7. The Asc Ao is 3.40 cm. There is upper limits of normal dilatation of the ascending aorta.    07/25/2024 - Exercise Stress Echo  1. Adequate level of stress achieved.  2. Average exercise capacity with peak  bpm at stage II of a Valdemar protocol at 5:55.  3. Maximal LV wall thickness 1.6 cm at the basal septum. There is mild TOMAS without septal contact. Pre-stress LVOT gradient with valsalva is 28 mmHg.  4. Post stress there is mild TOMAS without septal contact. Peak LVOT post stress is ~27 mmHg.  5. No clinical, electrocardiographic or echocardiographic evidence for ischemia at a maximal workload.  6. No stress induced arrhythmias.    01/29/2024 - Cardiac MRI  1.  Normal left ventricular cavity size with hyperdynamic systolic function. Ejection fraction 74%.  2.  Basal asymmetric septal left ventricular hypertrophy. Basal anterior septum 1.5 cm. No evidence of left ventricular apical aneurysm.  3.  Delayed enhancement imaging is normal. No evidence of fibrosis, infiltrative disease, previous myocardial infarction, or inflammation of the left ventricular or right ventricular myocardium.  4. Normal right ventricular cavity size with preserved systolic function. RV EF 58%.  5. Thickened mitral valve leaflets with mild-moderate mitral valve regurgitation.    12/30/2023 to 01/13/2024 - [facility] Monitor   1. Predominant underlying rhythm was sinus rhythm; min HR 46 bpm, max  bpm, avg HR 74 bpm.  2. 8757 supraventricular tachycardia runs occurred; fastest interval lasting 9.9 seconds with max rate 207 bpm, longest lasting 32 seconds with avg rate 115 bpm.  3. Isolated SVEs were rare, SVE couplets were rare, and SVE triplets were rare.  4. Isolated VEs were rare, VE couplets were rare, and VE triplets were rare.  5. Ventricular bigeminy and trigeminy were present.     12/26/2023 - CTA Coronary Arteries  1.  Mild calcified atherosclerotic disease in the  LEFT anterior descending artery with mild stenosis. CAD-RAD 2: Mild luminal stenosis (25 to 49%). Mild non-obstructive CAD.  Overall Plaque [place]: P1: mild amount of plaque   2.  Moderate asymmetric hypertrophy of the LEFT ventricle (max dimension 1.6 cm).     02/23/2023 - TTE  1. The left ventricle is normal in size. There is moderate septal left ventricular hypertrophy. Left ventricular systolic function is normal. EF = 58 ± 5% (2D biplane) Grade I left ventricular diastolic dysfunction. GLS normal, -20.4%.   2. The right ventricle is normal in size. Right ventricular systolic function is normal.   3. The left atrial cavity is mildly dilated.   4. At rest LVOT gradient = 8mmHg (65bpm). Mild SHAHRIAR with 1-2+ MR. With valsalva LVOT gradient remains unchanged however an intracavitary gradient = 22mmHg develops. With amyl nitrite LVOT gradient = 10mmHg (55bpm). No change in SHAHRIAR or MR.   5. Exam was compared with the prior  echocardiographic exam performed on 2/5/2021. Similar resting LVOT gradients. Prior study was an exercise study with post exercise LVOT gradient of 100mmHg.     Assessment/Plan  1) HOCM - SVT  Also followed by Burt Davis MD    On bisoprolol 2.5 mg daily   Metoprolol previously discontinued   Previously followed by Dr. Abraham at [facility]  TTE 02/2020 with LVEF 76%, mild SHAHRIAR   Stress echo 02/2021 with 6.1 METS, normal BP response, minimal resting LVOT gradient, mild Shahriar, significant exercise gradient of 100 mmHg with severe SHAHRIAR  Zio patch monitor 03/2021 without significant ventricular arrhythmias  Cardiac MRI 5/2020 with mild asymmetric septal hypertrophy, mild SHAHRIAR, mild flow acceleration, mild focal delayed enhancement at RV insertion point in mid inferoseptum  TTE 02/2023 with LVEF 58 ± 5% , grade I LV diastolic dysfunction, rest LVOT gradient of 8mmHg (65bpm), mild SHAHRIAR, 1-2+ MR, LVOT unchanged with Valsalva but with developing intracavitary gradient of 22 mmHg, LVOT gradient of 10  "mmHg with amyl nitrite with no change in TOMAS or MR.  CTA coronary arteries with mild calcified atherosclerotic disease LAD with mild stenosis, Mild luminal stenosis (25 to 49%), mild non-obstructive CAD, moderate asymmetric hypertrophy of the left ventricle (max dimension 1.6 cm).  Cardiac MRI 01/29/2024 with normal LV size and function (74%), basal asymmetric septal LVH (basal anterior septum 1.5 cm), normal delayed enhancement imaging, normal RV size and function (58%), thickened mitral valve leaflets with mild-moderate mitral regurgitation.  Zio patch monitor 12/30/2023 to 01/13/2024 with over 8000 runs of SVT  Subsequently referred to Dr. Hendrix, seen 02/05/2024 - bisoprolol increased to 5 mg daily, referral placed to HCM clinic, need for primary prevention ICD to be reassess following HCM clinic evaluation    Evaluated by Dr. Davis at the HCM clinic 02/19/2024 - risk of SCD from HCM suspected to be low and thus does not believe the patient warrants ICD implantation at this time.  Exercise stress echo 07/25/2024 with mild TOMAS without septal contact, negative for ischemia, no stress induced arrhythmias.   TTE 07/25/2024 with LVEF 60-65%, upper septal hypertrophy measuring 1.6 cm, echo bright \"contact lesion\" at basal septum suggestion prior TOMAS-septal contact, turbulence in LVOT with mild TOMAS, LVOT gradient at rest of 8 mmHg, peak LVOT gradient with Valsalva of 15 mmHg (consistent with hypertrophic cardiomyopathy without obstruction), normal RV systolic function, ascending aorta measuring 3.40 cm.   Denies CP, chest discomfort or SOB  BP stable  Continue current medical Rx   F/U 1 year    2) HTN  Stable   On bisoprolol 2.5 mg daily  Also manages her BP through conservative management with salt restriction   Continue current medical Rx   F/U 1 year    3) Hyperlipidemia  Patient is hesitant to start statin therapy s/t side effects   Advised on LDL goal of <70  CTA coronary arteries with mild calcified " atherosclerotic disease LAD with mild stenosis, Mild luminal stenosis (25 to 49%), mild non-obstructive CAD, moderate asymmetric hypertrophy of the left ventricle (max dimension 1.6 cm).  Provided the patient with information re: Mediterranean style diet   NMR profile 07/16/2024 with elevated LDL-P of 1762, elevated small LDL-P of 707, elevated total cholesterol of 232, elevated LDL-C of 143, elevated LPO-IR score of 82.  Lipid panel 12/09/2024 with total cholesterol, LDL and triglycerides of 197, 112 and 154 respectively  Patient has declined statins and Repatha   F/U 1 year    4) Weight Management   Patient inquired about the use of Wegovy for weight loss in light of potential cardiovascular benefits   Advised that this medication should only be prescribed under the guidance of an endocrinologist as it does come with side effects such as gastric dysmotolity and neuroendocrine tumors.  Previously offered referral to bariatrics, but patient declined     5) Sleep Apnea  Recent sleep study positive for mild HODA, per patient   Will be following up with specialist     6) Upper Limits of Normal Dilatation of Ascending Aorta  TTE 07/25/2024 with ascending aorta measuring 3.40 cm; upper limits of normal dilatation of the ascending aorta.        Scribe Attestation  By signing my name below, I, Seb Thomas   attest that this documentation has been prepared under the direction and in the presence of Naveed Mathis MD.

## 2024-12-10 NOTE — PROGRESS NOTES
"Counseling:  The patient was counseled regarding diagnostic results, instructions for management, risk factor reductions, prognosis, patient and family education, impressions, risks and benefits of treatment options and importance of compliance with treatment.      Chief Complaint:  The patient presents today for 6-month followup of HOCM, HTN, SVT and hyperlipidemia.     History Of Present Illness:    Catrachita Cantu is a 69 y.o. female patient who presents today for 6-month followup of HOCM, HTN, SVT and hyperlipidemia. Her PMH is significant for HOCM, HTN, dyslipidemia, ventral and umbilical hernia, anxiety, borderline diabetes, depression, Merkel cell carcinoma s/p surgical removal 2016, mitral insufficiency, and SVT. Over the past 6 months, the patient states that she has done well from a cardiac standpoint. She denies any CP, chest discomfort or SOB. BP has been stable. The patient is compliant with her prescribed medications.     Past Surgical History:  She has no past surgical history on file.      Social History:  She reports that she has never smoked. She has never used smokeless tobacco. She reports that she does not currently use alcohol. She reports current drug use.    Family History:  No family history on file.     Allergies:  Gentamicin    Outpatient Medications:  Current Outpatient Medications   Medication Instructions    bisoprolol (ZEBETA) 2.5 mg, oral, Daily    FLUoxetine (PROZAC) 40 mg, Daily    metFORMIN XR (GLUCOPHAGE-XR) 500 mg        Last Recorded Vitals:  Vitals:    12/10/24 1440   BP: 136/88   BP Location: Left arm   Pulse: 71   SpO2: 96%   Weight: 89.8 kg (198 lb)   Height: 1.575 m (5' 2\")       Review of Systems   All other systems reviewed and are negative.     Physical Exam:  Constitutional:       Appearance: Healthy appearance. Not in distress.   Neck:      Vascular: No JVR. JVD normal.   Pulmonary:      Effort: Pulmonary effort is normal.      Breath sounds: Normal breath sounds. No " "wheezing. No rhonchi. No rales.   Chest:      Chest wall: Not tender to palpatation.   Cardiovascular:      PMI at left midclavicular line. Normal rate. Regular rhythm. Normal S1. Normal S2.       Murmurs: There is no murmur.      No gallop.  No click. No rub.   Pulses:     Intact distal pulses.   Edema:     Peripheral edema absent.   Abdominal:      General: Bowel sounds are normal.      Palpations: Abdomen is soft.      Tenderness: There is no abdominal tenderness.   Musculoskeletal: Normal range of motion.         General: No tenderness. Skin:     General: Skin is warm and dry.   Neurological:      General: No focal deficit present.      Mental Status: Alert and oriented to person, place and time.        Last Labs:  Lab Results   Component Value Date    HGBA1C 5.5 01/08/2024       Last Cardiology Tests:  07/25/2024 - TTE  1. Left ventricular ejection fraction is normal, by visual estimate at 60-65%.  2. There is upper septal hypertrophy measuring 1.6 cm. There is an echo bright \"contact lesion\" at the basal septum suggestion prior TOMAS-septal contact. There is turbulence in the LVOT with mild TOMAS. LVOT gradient at rest is 8 mmHg. Peak LVOT gradient with Valsalva is 15 mmHg. Findings are consistent with hypertrophic cardiomyopathy without obstruction.  3. There is normal right ventricular global systolic function.  4. The left atrium is mildly dilated.  5. RVSP within normal limits.  6. Normal aortic root.  7. The Asc Ao is 3.40 cm. There is upper limits of normal dilatation of the ascending aorta.    07/25/2024 - Exercise Stress Echo  1. Adequate level of stress achieved.  2. Average exercise capacity with peak  bpm at stage II of a Valdemar protocol at 5:55.  3. Maximal LV wall thickness 1.6 cm at the basal septum. There is mild TOMAS without septal contact. Pre-stress LVOT gradient with valsalva is 28 mmHg.  4. Post stress there is mild TOMAS without septal contact. Peak LVOT post stress is ~27 mmHg.  5. No " clinical, electrocardiographic or echocardiographic evidence for ischemia at a maximal workload.  6. No stress induced arrhythmias.    01/29/2024 - Cardiac MRI  1.  Normal left ventricular cavity size with hyperdynamic systolic function. Ejection fraction 74%.  2.  Basal asymmetric septal left ventricular hypertrophy. Basal anterior septum 1.5 cm. No evidence of left ventricular apical aneurysm.  3.  Delayed enhancement imaging is normal. No evidence of fibrosis, infiltrative disease, previous myocardial infarction, or inflammation of the left ventricular or right ventricular myocardium.  4. Normal right ventricular cavity size with preserved systolic function. RV EF 58%.  5. Thickened mitral valve leaflets with mild-moderate mitral valve regurgitation.    12/30/2023 to 01/13/2024 - Zio Monitor   1. Predominant underlying rhythm was sinus rhythm; min HR 46 bpm, max  bpm, avg HR 74 bpm.  2. 8757 supraventricular tachycardia runs occurred; fastest interval lasting 9.9 seconds with max rate 207 bpm, longest lasting 32 seconds with avg rate 115 bpm.  3. Isolated SVEs were rare, SVE couplets were rare, and SVE triplets were rare.  4. Isolated VEs were rare, VE couplets were rare, and VE triplets were rare.  5. Ventricular bigeminy and trigeminy were present.     12/26/2023 - CTA Coronary Arteries  1.  Mild calcified atherosclerotic disease in the LEFT anterior descending artery with mild stenosis. CAD-RAD 2: Mild luminal stenosis (25 to 49%). Mild non-obstructive CAD.  Overall Plaque Stockett: P1: mild amount of plaque   2.  Moderate asymmetric hypertrophy of the LEFT ventricle (max dimension 1.6 cm).     02/23/2023 - TTE  1. The left ventricle is normal in size. There is moderate septal left ventricular hypertrophy. Left ventricular systolic function is normal. EF = 58 ± 5% (2D biplane) Grade I left ventricular diastolic dysfunction. GLS normal, -20.4%.   2. The right ventricle is normal in size. Right ventricular  systolic function is normal.   3. The left atrial cavity is mildly dilated.   4. At rest LVOT gradient = 8mmHg (65bpm). Mild SHAHRIAR with 1-2+ MR. With valsalva LVOT gradient remains unchanged however an intracavitary gradient = 22mmHg develops. With amyl nitrite LVOT gradient = 10mmHg (55bpm). No change in SHAHRIAR or MR.   5. Exam was compared with the prior  echocardiographic exam performed on 2/5/2021. Similar resting LVOT gradients. Prior study was an exercise study with post exercise LVOT gradient of 100mmHg.     Assessment/Plan   1) HOCM - SVT  Also followed by Burt Davis MD    On bisoprolol 2.5 mg daily   Metoprolol previously discontinued   Previously followed by Dr. Abraham at [facility]  TTE 02/2020 with LVEF 76%, mild SHAHRIAR   Stress echo 02/2021 with 6.1 METS, normal BP response, minimal resting LVOT gradient, mild Shahriar, significant exercise gradient of 100 mmHg with severe SHAHRIAR  Zio patch monitor 03/2021 without significant ventricular arrhythmias  Cardiac MRI 5/2020 with mild asymmetric septal hypertrophy, mild SHAHRIAR, mild flow acceleration, mild focal delayed enhancement at RV insertion point in mid inferoseptum  TTE 02/2023 with LVEF 58 ± 5% , grade I LV diastolic dysfunction, rest LVOT gradient of 8mmHg (65bpm), mild SHAHRIAR, 1-2+ MR, LVOT unchanged with Valsalva but with developing intracavitary gradient of 22 mmHg, LVOT gradient of 10 mmHg with amyl nitrite with no change in SHAHRIAR or MR.  CTA coronary arteries with mild calcified atherosclerotic disease LAD with mild stenosis, Mild luminal stenosis (25 to 49%), mild non-obstructive CAD, moderate asymmetric hypertrophy of the left ventricle (max dimension 1.6 cm).  Cardiac MRI 01/29/2024 with normal LV size and function (74%), basal asymmetric septal LVH (basal anterior septum 1.5 cm), normal delayed enhancement imaging, normal RV size and function (58%), thickened mitral valve leaflets with mild-moderate mitral regurgitation.  Zio patch monitor 12/30/2023  "to 01/13/2024 with over 8000 runs of SVT  Subsequently referred to Dr. Hendrix, seen 02/05/2024 - bisoprolol increased to 5 mg daily, referral placed to HCM clinic, need for primary prevention ICD to be reassess following HCM clinic evaluation    Evaluated by Dr. Davis at the HCM clinic 02/19/2024 - risk of SCD from HCM suspected to be low and thus does not believe the patient warrants ICD implantation at this time.  Exercise stress echo 07/25/2024 with mild TOMAS without septal contact, negative for ischemia, no stress induced arrhythmias.   TTE 07/25/2024 with LVEF 60-65%, upper septal hypertrophy measuring 1.6 cm, echo bright \"contact lesion\" at basal septum suggestion prior TOMAS-septal contact, turbulence in LVOT with mild TOMAS, LVOT gradient at rest of 8 mmHg, peak LVOT gradient with Valsalva of 15 mmHg (consistent with hypertrophic cardiomyopathy without obstruction), normal RV systolic function, ascending aorta measuring 3.40 cm.   Denies CP, chest discomfort or SOB  BP stable  Continue current medical Rx   F/U 1 year    2) HTN  Stable   On bisoprolol 2.5 mg daily  Also manages her BP through conservative management with salt restriction   Continue current medical Rx   F/U 1 year    3) Hyperlipidemia  Patient is hesitant to start statin therapy s/t side effects   Advised on LDL goal of <70  CTA coronary arteries with mild calcified atherosclerotic disease LAD with mild stenosis, Mild luminal stenosis (25 to 49%), mild non-obstructive CAD, moderate asymmetric hypertrophy of the left ventricle (max dimension 1.6 cm).  Provided the patient with information re: Mediterranean style diet   NMR profile 07/16/2024 with elevated LDL-P of 1762, elevated small LDL-P of 707, elevated total cholesterol of 232, elevated LDL-C of 143, elevated LPO-IR score of 82.  Lipid panel 12/09/2024 with total cholesterol, LDL and triglycerides of 197, 112 and 154 respectively  Patient has declined statins and Repatha   F/U 1 year    4) " Weight Management   Patient inquired about the use of Wegovy for weight loss in light of potential cardiovascular benefits   Advised that this medication should only be prescribed under the guidance of an endocrinologist as it does come with side effects such as gastric dysmotolity and neuroendocrine tumors.  Previously offered referral to bariatrics, but patient declined     5) Sleep Apnea  Recent sleep study positive for mild HODA, per patient   Will be following up with specialist     6) Upper Limits of Normal Dilatation of Ascending Aorta  TTE 07/25/2024 with ascending aorta measuring 3.40 cm; upper limits of normal dilatation of the ascending aorta.        Scribe Attestation  By signing my name below, I, Seb Thomas   attest that this documentation has been prepared under the direction and in the presence of Naveed Mathis MD.

## 2024-12-16 ENCOUNTER — APPOINTMENT (OUTPATIENT)
Dept: CARDIOLOGY | Facility: CLINIC | Age: 69
End: 2024-12-16
Payer: MEDICARE

## 2025-01-02 ENCOUNTER — TELEPHONE (OUTPATIENT)
Dept: CARDIOLOGY | Facility: HOSPITAL | Age: 70
End: 2025-01-02
Payer: MEDICARE

## 2025-01-02 NOTE — TELEPHONE ENCOUNTER
Patient returns call.  States that she had 2 episodes that she wanted to report:    1) Right shoulder blade & back discomfort for 2 days.  States more pronounced when she is attempting to reach &  objects, not associated with activity or exertion, eating, going out in cold, or emotional stress.   Does not seem to be more pronounced with aerobic activity.  No symptoms of lightheadedness, SOB, nausea, palpitations, or diaphoresis.     2) sharp like pain under her left rib after completing  a [employer] class.  No discomfort during the class.  Patient states that the elliptical machine was getting boring & she wanted to do an aerobic activity she enjoyed, so she returned to [facility] after 7 years. Discomfort resolved after 15 min.   No symptoms of lightheadedness, SOB, nausea, palpitations, or diaphoresis.     Had lengthy discussion with patient re: angina & worsening LVOT gradient symptoms as they are similar, & patient has had LVOT gradients above 30mmHg in past.  Patient is going to better track symptoms & report back to [facility] in a week.

## 2025-01-02 NOTE — TELEPHONE ENCOUNTER
Patient leaves voicemail stating that she has been experiencing back & shoulder blade discomfort, & pain under the L ribcage anteriorly.   Attempted to phone patient back for more information.  Left VM to return call to [facility] center.

## 2025-04-15 ENCOUNTER — OFFICE VISIT (OUTPATIENT)
Dept: SURGERY | Facility: CLINIC | Age: 70
End: 2025-04-15
Payer: MEDICARE

## 2025-04-15 ENCOUNTER — TELEPHONE (OUTPATIENT)
Dept: CARDIOLOGY | Facility: HOSPITAL | Age: 70
End: 2025-04-15
Payer: MEDICARE

## 2025-04-15 VITALS
HEIGHT: 62 IN | TEMPERATURE: 97.9 F | HEART RATE: 76 BPM | WEIGHT: 199 LBS | DIASTOLIC BLOOD PRESSURE: 84 MMHG | SYSTOLIC BLOOD PRESSURE: 179 MMHG | BODY MASS INDEX: 36.62 KG/M2

## 2025-04-15 DIAGNOSIS — K43.9 VENTRAL HERNIA WITHOUT OBSTRUCTION OR GANGRENE: Primary | ICD-10-CM

## 2025-04-15 PROCEDURE — 1036F TOBACCO NON-USER: CPT | Performed by: SURGERY

## 2025-04-15 PROCEDURE — 1160F RVW MEDS BY RX/DR IN RCRD: CPT | Performed by: SURGERY

## 2025-04-15 PROCEDURE — 99213 OFFICE O/P EST LOW 20 MIN: CPT | Performed by: SURGERY

## 2025-04-15 PROCEDURE — 3008F BODY MASS INDEX DOCD: CPT | Performed by: SURGERY

## 2025-04-15 PROCEDURE — 3079F DIAST BP 80-89 MM HG: CPT | Performed by: SURGERY

## 2025-04-15 PROCEDURE — 3077F SYST BP >= 140 MM HG: CPT | Performed by: SURGERY

## 2025-04-15 PROCEDURE — 1159F MED LIST DOCD IN RCRD: CPT | Performed by: SURGERY

## 2025-04-15 PROCEDURE — 1126F AMNT PAIN NOTED NONE PRSNT: CPT | Performed by: SURGERY

## 2025-04-15 ASSESSMENT — PAIN SCALES - GENERAL: PAINLEVEL_OUTOF10: 0-NO PAIN

## 2025-04-15 NOTE — PROGRESS NOTES
"History Of Present Illness  Catrachita Cantu is a 69 y.o. female presenting for follow-up of her umbilical hernia.  I seen her several years ago for this.  She is now having more pain with this after some coughing episodes.  She had a previous hysterectomy and an open incision.  She has lost some weight.  She is followed very carefully by our cardiac team.  She had full evaluation in December this past year.  Over the last year she has had a stress echo and an echo and a cardiac evaluation.  She is very stable from that standpoint.  She had recent laboratory studies in December that were all stable and normal.        Last Recorded Vitals  Blood pressure 179/84, pulse 76, temperature 36.6 °C (97.9 °F), height 1.575 m (5' 2\"), weight 90.3 kg (199 lb).  Physical Examination  Awake and alert.    Abdominal examination she has a lower midline incision along with umbilical hernia.  Fascial defect is about 3 cm it is not reducible.      Relevant Results  Reviewed her echo stress test her regular echo and EKG.  Along with her laboratory studies.    Assessment/Plan patient with umbilical hernia.  I again discussed repair of this using MAC anesthesia most likely will require mesh for repair  because of her weight.  We may decide to use a light general anesthetic which she also agrees with.  She understands use of mesh.  I reviewed the hernia booklet with her.  She will call my office to schedule the procedure.    Jim Urena MD FACS  Professor of Surgery  Dion Gueavra Chair in Surgical [place]  [facility] [employer]  [address] [place] [address], [id number]  Phone [phone]  email: [email or web address]        "

## 2025-04-15 NOTE — TELEPHONE ENCOUNTER
Pt called regarding 174/84 BP - pt asking if she should take a half pill vs whole pill of bisoprolol to help with this. Pt states she was just dx with shingles and thinks this may be contributing. Routing to nursing pool for support.

## 2025-04-17 ENCOUNTER — TELEPHONE (OUTPATIENT)
Dept: CARDIOLOGY | Facility: HOSPITAL | Age: 70
End: 2025-04-17
Payer: MEDICARE

## 2025-04-17 NOTE — TELEPHONE ENCOUNTER
4/21/25  1754  Called patient; no answer. Left voice message for patient to return call to discuss her blood pressure.    Encounter closed due to inability to reach patient.      4/18/25  1203  Called patient; no answer. Left voice message for patient to return call to discuss her blood pressure and bisoprolol.      4/17/25  1652  Called patient; no answer. Left voice message for patient to return call to discuss her blood pressure and bisoprolol.

## 2025-05-23 ENCOUNTER — TELEPHONE (OUTPATIENT)
Dept: CARDIOLOGY | Facility: HOSPITAL | Age: 70
End: 2025-05-23
Payer: MEDICARE

## 2025-05-23 DIAGNOSIS — I42.1 HOCM (HYPERTROPHIC OBSTRUCTIVE CARDIOMYOPATHY) (MULTI): Primary | ICD-10-CM

## 2025-05-23 NOTE — TELEPHONE ENCOUNTER
Patient calls to discuss some changing symptoms that she has been noticing over the course of the last several months.  Has noted that she has been experiencing more SOB with exertion. States that when walking from parking lot she gets winded & fatigued.  Seems to be worse in am.  Still goes to the [facility], but notes the aerobic classes are more difficult for her.     Reviewed meds.  Patients currently takes bisoprolol 2.5mg, but reports drug intolerances & sensitivities. BP at last provider OV high.  Advised patient to take home BP & HR & log.     Patient resides in [place] & has yearly visit with Dr. Davis 7/25/25.  Informed patient that Dr. Davis may want additional testing at time of OV.  In the interim, patient should increase her PO water intake as this may improve symptoms.  Patient admits that she could do better with hydration.  Informed patient that I would review with Dr. Davis upon his return, & share his recommendations

## 2025-05-23 NOTE — TELEPHONE ENCOUNTER
Reviewed with Dr. Davis.  Will schedule a stress echo same day as OV to assess for LVOT obstruction with exercise. Left patient a VM to call HCM center to coordinate

## 2025-05-27 NOTE — TELEPHONE ENCOUNTER
Spoke with patient.  Aware that Dr. Davis would like a repeat stress echo at time of her next visit.  Patient scheduled 7/10/25

## 2025-06-24 ENCOUNTER — PATIENT MESSAGE (OUTPATIENT)
Dept: CARDIOLOGY | Facility: CLINIC | Age: 70
End: 2025-06-24

## 2025-07-07 ENCOUNTER — TELEPHONE (OUTPATIENT)
Dept: CARDIOLOGY | Facility: HOSPITAL | Age: 70
End: 2025-07-07
Payer: MEDICARE

## 2025-07-10 ENCOUNTER — OFFICE VISIT (OUTPATIENT)
Dept: CARDIOLOGY | Facility: CLINIC | Age: 70
End: 2025-07-10
Payer: MEDICARE

## 2025-07-10 ENCOUNTER — HOSPITAL ENCOUNTER (OUTPATIENT)
Dept: CARDIOLOGY | Facility: CLINIC | Age: 70
Discharge: HOME | End: 2025-07-10
Payer: MEDICARE

## 2025-07-10 ENCOUNTER — APPOINTMENT (OUTPATIENT)
Dept: CARDIOLOGY | Facility: CLINIC | Age: 70
End: 2025-07-10
Payer: MEDICARE

## 2025-07-10 VITALS
DIASTOLIC BLOOD PRESSURE: 77 MMHG | BODY MASS INDEX: 36.4 KG/M2 | HEIGHT: 62 IN | HEART RATE: 73 BPM | OXYGEN SATURATION: 92 % | SYSTOLIC BLOOD PRESSURE: 134 MMHG

## 2025-07-10 DIAGNOSIS — I42.1 HOCM (HYPERTROPHIC OBSTRUCTIVE CARDIOMYOPATHY) (MULTI): ICD-10-CM

## 2025-07-10 DIAGNOSIS — I49.49 OTHER PREMATURE DEPOLARIZATION: ICD-10-CM

## 2025-07-10 DIAGNOSIS — I42.1 HOCM (HYPERTROPHIC OBSTRUCTIVE CARDIOMYOPATHY) (MULTI): Primary | ICD-10-CM

## 2025-07-10 PROCEDURE — 93242 EXT ECG>48HR<7D RECORDING: CPT

## 2025-07-10 PROCEDURE — 3075F SYST BP GE 130 - 139MM HG: CPT | Performed by: INTERNAL MEDICINE

## 2025-07-10 PROCEDURE — 3078F DIAST BP <80 MM HG: CPT | Performed by: INTERNAL MEDICINE

## 2025-07-10 PROCEDURE — 1036F TOBACCO NON-USER: CPT | Performed by: INTERNAL MEDICINE

## 2025-07-10 PROCEDURE — G2211 COMPLEX E/M VISIT ADD ON: HCPCS | Performed by: INTERNAL MEDICINE

## 2025-07-10 PROCEDURE — 99212 OFFICE O/P EST SF 10 MIN: CPT

## 2025-07-10 PROCEDURE — 1126F AMNT PAIN NOTED NONE PRSNT: CPT | Performed by: INTERNAL MEDICINE

## 2025-07-10 PROCEDURE — 93005 ELECTROCARDIOGRAM TRACING: CPT | Performed by: INTERNAL MEDICINE

## 2025-07-10 PROCEDURE — 99214 OFFICE O/P EST MOD 30 MIN: CPT | Performed by: INTERNAL MEDICINE

## 2025-07-10 PROCEDURE — 1159F MED LIST DOCD IN RCRD: CPT | Performed by: INTERNAL MEDICINE

## 2025-07-10 RX ORDER — METFORMIN HYDROCHLORIDE 500 MG/1
1000 TABLET ORAL DAILY
COMMUNITY

## 2025-07-10 ASSESSMENT — COLUMBIA-SUICIDE SEVERITY RATING SCALE - C-SSRS
1. IN THE PAST MONTH, HAVE YOU WISHED YOU WERE DEAD OR WISHED YOU COULD GO TO SLEEP AND NOT WAKE UP?: NO
6. HAVE YOU EVER DONE ANYTHING, STARTED TO DO ANYTHING, OR PREPARED TO DO ANYTHING TO END YOUR LIFE?: NO
2. HAVE YOU ACTUALLY HAD ANY THOUGHTS OF KILLING YOURSELF?: NO

## 2025-07-10 ASSESSMENT — ENCOUNTER SYMPTOMS
OCCASIONAL FEELINGS OF UNSTEADINESS: 0
DEPRESSION: 0
LOSS OF SENSATION IN FEET: 0

## 2025-07-10 ASSESSMENT — PAIN SCALES - GENERAL: PAINLEVEL_OUTOF10: 0-NO PAIN

## 2025-07-10 ASSESSMENT — PATIENT HEALTH QUESTIONNAIRE - PHQ9
SUM OF ALL RESPONSES TO PHQ9 QUESTIONS 1 AND 2: 0
1. LITTLE INTEREST OR PLEASURE IN DOING THINGS: NOT AT ALL
2. FEELING DOWN, DEPRESSED OR HOPELESS: NOT AT ALL

## 2025-07-10 NOTE — Clinical Note
Karla Wise seems to be doing ok overall. No major changes. Will see her back in one year with an echo on that date. -Roberto

## 2025-07-14 LAB
ATRIAL RATE: 68 BPM
P AXIS: 59 DEGREES
P OFFSET: 193 MS
P ONSET: 133 MS
PR INTERVAL: 170 MS
Q ONSET: 218 MS
QRS COUNT: 11 BEATS
QRS DURATION: 90 MS
QT INTERVAL: 404 MS
QTC CALCULATION(BAZETT): 429 MS
QTC FREDERICIA: 421 MS
R AXIS: 68 DEGREES
T AXIS: 48 DEGREES
T OFFSET: 420 MS
VENTRICULAR RATE: 68 BPM

## 2025-07-24 ENCOUNTER — APPOINTMENT (OUTPATIENT)
Dept: CARDIOLOGY | Facility: CLINIC | Age: 70
End: 2025-07-24
Payer: MEDICARE

## 2025-08-05 ENCOUNTER — ANESTHESIA EVENT (OUTPATIENT)
Dept: OPERATING ROOM | Facility: HOSPITAL | Age: 70
End: 2025-08-05
Payer: MEDICARE

## 2025-08-05 ENCOUNTER — HOSPITAL ENCOUNTER (OUTPATIENT)
Facility: HOSPITAL | Age: 70
Setting detail: OUTPATIENT SURGERY
Discharge: HOME | End: 2025-08-05
Attending: SURGERY | Admitting: SURGERY
Payer: MEDICARE

## 2025-08-05 ENCOUNTER — ANESTHESIA (OUTPATIENT)
Dept: OPERATING ROOM | Facility: HOSPITAL | Age: 70
End: 2025-08-05
Payer: MEDICARE

## 2025-08-05 ENCOUNTER — PHARMACY VISIT (OUTPATIENT)
Dept: PHARMACY | Facility: CLINIC | Age: 70
End: 2025-08-05
Payer: MEDICARE

## 2025-08-05 VITALS
TEMPERATURE: 96.8 F | OXYGEN SATURATION: 99 % | RESPIRATION RATE: 14 BRPM | DIASTOLIC BLOOD PRESSURE: 81 MMHG | HEART RATE: 58 BPM | SYSTOLIC BLOOD PRESSURE: 138 MMHG

## 2025-08-05 DIAGNOSIS — K43.9 VENTRAL HERNIA WITHOUT OBSTRUCTION OR GANGRENE: Primary | ICD-10-CM

## 2025-08-05 DIAGNOSIS — K43.6 INCARCERATED VENTRAL HERNIA: ICD-10-CM

## 2025-08-05 PROBLEM — Q24.9 CONGENITAL HEART DISEASE: Status: ACTIVE | Noted: 2025-08-05

## 2025-08-05 PROCEDURE — 2500000005 HC RX 250 GENERAL PHARMACY W/O HCPCS: Performed by: SURGERY

## 2025-08-05 PROCEDURE — 3600000003 HC OR TIME - INITIAL BASE CHARGE - PROCEDURE LEVEL THREE: Performed by: SURGERY

## 2025-08-05 PROCEDURE — 7100000001 HC RECOVERY ROOM TIME - INITIAL BASE CHARGE: Performed by: SURGERY

## 2025-08-05 PROCEDURE — 2500000004 HC RX 250 GENERAL PHARMACY W/ HCPCS (ALT 636 FOR OP/ED): Performed by: SURGERY

## 2025-08-05 PROCEDURE — RXMED WILLOW AMBULATORY MEDICATION CHARGE

## 2025-08-05 PROCEDURE — 7100000010 HC PHASE TWO TIME - EACH INCREMENTAL 1 MINUTE: Performed by: SURGERY

## 2025-08-05 PROCEDURE — 3700000001 HC GENERAL ANESTHESIA TIME - INITIAL BASE CHARGE: Performed by: SURGERY

## 2025-08-05 PROCEDURE — 3700000002 HC GENERAL ANESTHESIA TIME - EACH INCREMENTAL 1 MINUTE: Performed by: SURGERY

## 2025-08-05 PROCEDURE — 49592 RPR AA HRN 1ST < 3 NCR/STRN: CPT | Performed by: SURGERY

## 2025-08-05 PROCEDURE — 2500000004 HC RX 250 GENERAL PHARMACY W/ HCPCS (ALT 636 FOR OP/ED)

## 2025-08-05 PROCEDURE — 2720000007 HC OR 272 NO HCPCS: Performed by: SURGERY

## 2025-08-05 PROCEDURE — 7100000009 HC PHASE TWO TIME - INITIAL BASE CHARGE: Performed by: SURGERY

## 2025-08-05 PROCEDURE — 2500000001 HC RX 250 WO HCPCS SELF ADMINISTERED DRUGS (ALT 637 FOR MEDICARE OP): Performed by: STUDENT IN AN ORGANIZED HEALTH CARE EDUCATION/TRAINING PROGRAM

## 2025-08-05 PROCEDURE — 7100000002 HC RECOVERY ROOM TIME - EACH INCREMENTAL 1 MINUTE: Performed by: SURGERY

## 2025-08-05 PROCEDURE — 3600000008 HC OR TIME - EACH INCREMENTAL 1 MINUTE - PROCEDURE LEVEL THREE: Performed by: SURGERY

## 2025-08-05 RX ORDER — FENTANYL CITRATE 50 UG/ML
INJECTION, SOLUTION INTRAMUSCULAR; INTRAVENOUS AS NEEDED
Status: DISCONTINUED | OUTPATIENT
Start: 2025-08-05 | End: 2025-08-05

## 2025-08-05 RX ORDER — SODIUM CHLORIDE 0.9 G/100ML
INJECTION, SOLUTION IRRIGATION AS NEEDED
Status: DISCONTINUED | OUTPATIENT
Start: 2025-08-05 | End: 2025-08-05 | Stop reason: HOSPADM

## 2025-08-05 RX ORDER — ONDANSETRON HYDROCHLORIDE 2 MG/ML
4 INJECTION, SOLUTION INTRAVENOUS ONCE AS NEEDED
Status: DISCONTINUED | OUTPATIENT
Start: 2025-08-05 | End: 2025-08-05 | Stop reason: HOSPADM

## 2025-08-05 RX ORDER — LABETALOL HYDROCHLORIDE 5 MG/ML
5 INJECTION, SOLUTION INTRAVENOUS ONCE AS NEEDED
Status: DISCONTINUED | OUTPATIENT
Start: 2025-08-05 | End: 2025-08-05 | Stop reason: HOSPADM

## 2025-08-05 RX ORDER — LIDOCAINE HYDROCHLORIDE 10 MG/ML
0.1 INJECTION, SOLUTION EPIDURAL; INFILTRATION; INTRACAUDAL; PERINEURAL ONCE
Status: DISCONTINUED | OUTPATIENT
Start: 2025-08-05 | End: 2025-08-05 | Stop reason: HOSPADM

## 2025-08-05 RX ORDER — LIDOCAINE HYDROCHLORIDE 20 MG/ML
INJECTION, SOLUTION EPIDURAL; INFILTRATION; INTRACAUDAL; PERINEURAL AS NEEDED
Status: DISCONTINUED | OUTPATIENT
Start: 2025-08-05 | End: 2025-08-05

## 2025-08-05 RX ORDER — FENTANYL CITRATE 50 UG/ML
12.5 INJECTION, SOLUTION INTRAMUSCULAR; INTRAVENOUS EVERY 5 MIN PRN
Status: DISCONTINUED | OUTPATIENT
Start: 2025-08-05 | End: 2025-08-05 | Stop reason: HOSPADM

## 2025-08-05 RX ORDER — PHENYLEPHRINE HCL IN 0.9% NACL 1 MG/10 ML
SYRINGE (ML) INTRAVENOUS AS NEEDED
Status: DISCONTINUED | OUTPATIENT
Start: 2025-08-05 | End: 2025-08-05

## 2025-08-05 RX ORDER — OXYCODONE AND ACETAMINOPHEN 5; 325 MG/1; MG/1
1 TABLET ORAL EVERY 4 HOURS PRN
Status: DISCONTINUED | OUTPATIENT
Start: 2025-08-05 | End: 2025-08-05 | Stop reason: HOSPADM

## 2025-08-05 RX ORDER — TRAMADOL HYDROCHLORIDE 50 MG/1
50 TABLET, FILM COATED ORAL EVERY 8 HOURS PRN
Qty: 10 TABLET | Refills: 0 | Status: SHIPPED | OUTPATIENT
Start: 2025-08-05

## 2025-08-05 RX ORDER — ALBUTEROL SULFATE 0.83 MG/ML
2.5 SOLUTION RESPIRATORY (INHALATION) ONCE AS NEEDED
Status: DISCONTINUED | OUTPATIENT
Start: 2025-08-05 | End: 2025-08-05 | Stop reason: HOSPADM

## 2025-08-05 RX ORDER — PROPOFOL 10 MG/ML
INJECTION, EMULSION INTRAVENOUS CONTINUOUS PRN
Status: DISCONTINUED | OUTPATIENT
Start: 2025-08-05 | End: 2025-08-05

## 2025-08-05 RX ORDER — CEFAZOLIN 1 G/1
INJECTION, POWDER, FOR SOLUTION INTRAVENOUS AS NEEDED
Status: DISCONTINUED | OUTPATIENT
Start: 2025-08-05 | End: 2025-08-05

## 2025-08-05 RX ORDER — SODIUM CHLORIDE, SODIUM LACTATE, POTASSIUM CHLORIDE, CALCIUM CHLORIDE 600; 310; 30; 20 MG/100ML; MG/100ML; MG/100ML; MG/100ML
50 INJECTION, SOLUTION INTRAVENOUS CONTINUOUS
Status: ACTIVE | OUTPATIENT
Start: 2025-08-05 | End: 2025-08-05

## 2025-08-05 RX ORDER — SODIUM CHLORIDE, SODIUM LACTATE, POTASSIUM CHLORIDE, CALCIUM CHLORIDE 600; 310; 30; 20 MG/100ML; MG/100ML; MG/100ML; MG/100ML
INJECTION, SOLUTION INTRAVENOUS CONTINUOUS PRN
Status: DISCONTINUED | OUTPATIENT
Start: 2025-08-05 | End: 2025-08-05

## 2025-08-05 RX ORDER — DROPERIDOL 2.5 MG/ML
0.62 INJECTION, SOLUTION INTRAMUSCULAR; INTRAVENOUS ONCE AS NEEDED
Status: DISCONTINUED | OUTPATIENT
Start: 2025-08-05 | End: 2025-08-05 | Stop reason: HOSPADM

## 2025-08-05 RX ADMIN — Medication 100 MCG: at 12:38

## 2025-08-05 RX ADMIN — FENTANYL CITRATE 50 MCG: 50 INJECTION, SOLUTION INTRAMUSCULAR; INTRAVENOUS at 12:00

## 2025-08-05 RX ADMIN — CEFAZOLIN 2 G: 1 INJECTION, POWDER, FOR SOLUTION INTRAMUSCULAR; INTRAVENOUS at 11:54

## 2025-08-05 RX ADMIN — PROPOFOL 50 MG: 10 INJECTION, EMULSION INTRAVENOUS at 11:54

## 2025-08-05 RX ADMIN — Medication 100 MCG: at 12:30

## 2025-08-05 RX ADMIN — LIDOCAINE HYDROCHLORIDE 60 MG: 20 INJECTION, SOLUTION EPIDURAL; INFILTRATION; INTRACAUDAL; PERINEURAL at 11:53

## 2025-08-05 RX ADMIN — Medication: at 12:48

## 2025-08-05 RX ADMIN — OXYCODONE HYDROCHLORIDE AND ACETAMINOPHEN 1 TABLET: 5; 325 TABLET ORAL at 13:18

## 2025-08-05 RX ADMIN — FENTANYL CITRATE 50 MCG: 50 INJECTION, SOLUTION INTRAMUSCULAR; INTRAVENOUS at 11:53

## 2025-08-05 RX ADMIN — SODIUM CHLORIDE, SODIUM LACTATE, POTASSIUM CHLORIDE, AND CALCIUM CHLORIDE: .6; .31; .03; .02 INJECTION, SOLUTION INTRAVENOUS at 11:48

## 2025-08-05 RX ADMIN — PROPOFOL 100 MCG/KG/MIN: 10 INJECTION, EMULSION INTRAVENOUS at 11:53

## 2025-08-05 ASSESSMENT — PAIN - FUNCTIONAL ASSESSMENT
PAIN_FUNCTIONAL_ASSESSMENT: 0-10

## 2025-08-05 ASSESSMENT — PAIN SCALES - GENERAL
PAINLEVEL_OUTOF10: 0 - NO PAIN
PAINLEVEL_OUTOF10: 0 - NO PAIN
PAINLEVEL_OUTOF10: 3
PAINLEVEL_OUTOF10: 0 - NO PAIN
PAINLEVEL_OUTOF10: 2
PAINLEVEL_OUTOF10: 4

## 2025-08-05 ASSESSMENT — COLUMBIA-SUICIDE SEVERITY RATING SCALE - C-SSRS
2. HAVE YOU ACTUALLY HAD ANY THOUGHTS OF KILLING YOURSELF?: NO
1. IN THE PAST MONTH, HAVE YOU WISHED YOU WERE DEAD OR WISHED YOU COULD GO TO SLEEP AND NOT WAKE UP?: NO
6. HAVE YOU EVER DONE ANYTHING, STARTED TO DO ANYTHING, OR PREPARED TO DO ANYTHING TO END YOUR LIFE?: NO

## 2025-08-05 ASSESSMENT — PAIN DESCRIPTION - LOCATION: LOCATION: ABDOMEN

## 2025-08-05 NOTE — H&P
History Of Present Illness  Catrachita Cantu is a 69 y.o. female who presents with a symptomatic umbilical hernia.  She was seen in our office several years ago for this.  She is now having more pain with this after some coughing episodes.  She had a previous hysterectomy and an open incision.  She has lost some weight.  She is followed very carefully by our cardiac team.  She had full evaluation in December this past year.  Over the last year she has had a stress echo and an echo and a cardiac evaluation.  She is very stable from that standpoint.  She had recent laboratory studies completed today and CBC, BMP, A1C were within normal limits.     Past Medical History  Medical History[1]    Surgical History  Surgical History[2]     Social History  She reports that she has never smoked. She has never used smokeless tobacco. She reports that she does not currently use alcohol. She reports current drug use.    Family History  Family History[3]     Allergies  Gentamicin    Review of Systems   ROS: 12-point review of system performed and is negative except as stated in HPI.    Physical Exam   Awake and alert.     Abdominal examination she has a lower midline incision along with umbilical hernia.  Fascial defect is about 3 cm it is not reducible.    Last Recorded Vitals  Pulse 85, temperature 37 °C (98.6 °F), temperature source Temporal, resp. rate 15, SpO2 95%.    Relevant Results  CT Abdomen/Pelvis 9/28/2023  Moderate to large fat containing umbilical herniaa=    RADIOLOGY IMPRESSION:   Fat containing periumbilical hernia, also seen on the comparison.     Interpreted by: Norris Sung MD   Preliminary Report By: Wellington Lombardo   Electronically signed By Norris Sung MD   Dictated Date: 9/28/2023 8:43:40 AM   Prelim Date: 9/28/2023 1:45:46 PM   Sign Date: 9/28/2023 1:45:46 PM   Ordering Provider: PHY REFERRING     Assessment & Plan  Ventral hernia without obstruction or gangrene      69 year-old female who presents with an  umbilical hernia with mesh, under MAC anesthesia. Informed consent obtained. Risks/benefits/alternatives to treatment discussed clinic. All questions and concerns addressed. Hernia booklet given.     D/w Dr. Romel Enriquez MD  General Surgery PGY-3         [1]   Past Medical History:  Diagnosis Date    Abnormal ECG ?    Adverse effect of anesthesia 2003?    Thought I was allergic to gentimiacin    Anxiety     Not at present    Arthritis     Both shoulders, mild spine stenosis    Cancer (Multi) 2017    Cardiomyopathy     Hypertrophic    Congenital heart disease     Coronary artery disease     Just a little plaque    COVID-19     About a year ago    CPAP (continuous positive airway pressure) dependence     Mild. Was recommended, but never used it.    Depression     Not at present    Diverticulosis     Not hiwot, but haven’t had diverticulitis for years    Fibroid Probably 2002    Surgery to remove. Probably caused this hernia.    Fractures     Ankle, a couple years ago    Heart disease 2019?    Hypertrophic cardiomyopathy    HL (hearing loss)     Hyperlipidemia     Hypertension     Joint pain     Metabolic disease ?    Metabolic syndrome    Obesity     Skin disorder Removed 2016?    Merkel cell carcinoma.    Spinal stenosis     Mild   [2]   Past Surgical History:  Procedure Laterality Date    HYSTERECTOMY  2003?    OTHER SURGICAL HISTORY  2002, 2016    Fibroid removal, Merkel cell carcinoma excision    WISDOM TOOTH EXTRACTION     [3]   Family History  Problem Relation Name Age of Onset    Cancer Father SUSY Cantu     Colon cancer Paternal Grandmother Sofi Cantu     Heart disease Mother Yolanda Cantu 40 - 49    Early natural death Mother Yolanda Cantu 49    Heart disease Maternal Grandfather Pedro Owens 60 - 69    Hypertension Brother James Cantu     Vision loss Brother James Cantu 60    Sudden death Mother Yolanda Pepe 40 - 49    Heart attack Mother Yolanda Pepe     Obesity Mother Yolanda Pepe      Sudden death Maternal Grandfather Pedro Graciela     Heart attack Maternal Grandfather Pedro Narvaezrobin     Heart attack Mother Yolanda Cantu 40 - 49    Obesity Mother Yolanda aCntu     Sudden death Maternal Grandfather Pedro Narvaezrobin     Heart attack Maternal Grandfather Pedro rGaciela 60

## 2025-08-05 NOTE — ANESTHESIA POSTPROCEDURE EVALUATION
Patient: Catrachita Cantu    Procedure Summary       Date: 08/05/25 Room / Location: [facility] OR 06 / Virtual [facility] OR    Anesthesia Start: 1148 Anesthesia Stop:     Procedure: Umbilical Hernia Repair (Abdomen) Diagnosis:       Ventral hernia without obstruction or gangrene      (Ventral hernia without obstruction or gangrene [K43.9])    Surgeons: Jim Urena MD Responsible Provider: Pierce Zapata MD    Anesthesia Type: MAC ASA Status: 3            Anesthesia Type: MAC    Vitals Value Taken Time   /67 08/05/25 13:09   Temp 36 °C (96.8 °F) 08/05/25 13:00   Pulse 70 08/05/25 13:11   Resp 14 08/05/25 13:00   SpO2 95 % 08/05/25 13:11   Vitals shown include unfiled device data.    Anesthesia Post Evaluation    Patient location during evaluation: PACU  Patient participation: complete - patient participated  Level of consciousness: awake and alert  Pain management: adequate  Multimodal analgesia pain management approach  Airway patency: patent  Cardiovascular status: acceptable  Respiratory status: acceptable  Hydration status: acceptable  Postoperative Nausea and Vomiting: none        No notable events documented.

## 2025-08-05 NOTE — PERIOPERATIVE NURSING NOTE
1248 Assuming care of pt at this time. Bedside report received from outgoing RN. Pt arrived to [facility]  at this time, report received from OR and anesthesia staff. Phase 1 care started. Message sent to family via text at this time.   1300 pt placed on room air, see changes   1315 see changes   1318 percocet 5/325 mg given, water and brady crackers given   1330 no changes   1333 no changes, Pt meets discharge criteria from phase 1 at this time

## 2025-08-05 NOTE — OP NOTE
Umbilical Hernia Repair Operative Note     Date: 2025  OR Location: [facility] A OR    Name: Catrachita Cantu, : 1955, Age: 69 y.o., MRN: [id number], Sex: female    Diagnosis  Pre-op Diagnosis      * Ventral hernia without obstruction or gangrene [K43.9] Post-op Diagnosis     * Ventral hernia without obstruction or gangrene [K43.9]     * Incarcerated ventral hernia [K43.6]     Procedures  Umbilical Hernia Repair  [id number] - [place] RPR AA HERNIA 1ST < 3 CM NCRC8/STRANGULATED      Surgeons      * Jim Urena - Primary    Resident/Fellow/Other Assistant:  Surgeons and Role:  * No surgeons found with a matching role *    Staff:   Circulator: Maria G Rasmussen Person: Tila Vazquez Circulator: Christine Vazquez Scrub: Lesley    Anesthesia Staff: Anesthesiologist: Pierce Zapata MD  C-AA: YOAV Holloway    Procedure Summary  Anesthesia: Monitor Anesthesia Care  ASA: III  Estimated Blood Loss: 5mL  Intra-op Medications:   Administrations occurring from 1105 to 1225 on 25:   Medication Name Total Dose   sodium chloride 0.9 % irrigation solution 1,000 mL   BUPivacaine-EPINEPHrine (Marcaine w/EPI) 20 mL, lidocaine (Xylocaine) 20 mL syringe 27 mL   ceFAZolin (Ancef) vial 1 g 2 g   fentaNYL (Sublimaze) injection 50 mcg/mL 100 mcg   lactated Ringer's infusion Cannot be calculated   lidocaine PF (Xylocaine-MPF) local injection 2 % 60 mg   propofol (Diprivan) injection 10 mg/mL 407.75 mg              Anesthesia Record               Intraprocedure I/O Totals          Intake    lactated Ringer's 300.00 mL    Total Intake 300 mL          Specimen:   ID Type Source Tests Collected by Time   1 : INCARCERATED HERNIA AND OMENTUM Tissue HERNIA SAC SURGICAL PATHOLOGY EXAM Jim Urena MD 2025 1215                 Drains and/or Catheters: * None in log *    Tourniquet Times:         Implants:     Findings: 1.5 cm fascial defect with large amount of incarcerated omentum.    Indications: Catrachita Cantu is an 69 y.o. female who is  having surgery for Ventral hernia without obstruction or gangrene [K43.9].     The patient was seen in the preoperative area. The risks, benefits, complications, treatment options, non-operative alternatives, expected recovery and outcomes were discussed with the patient. The possibilities of reaction to medication, pulmonary aspiration, injury to surrounding structures, bleeding, recurrent infection, the need for additional procedures, failure to diagnose a condition, and creating a complication requiring transfusion or operation were discussed with the patient. The patient concurred with the proposed plan, giving informed consent.  The site of surgery was properly noted/marked if necessary per policy. The patient has been actively warmed in preoperative area. Preoperative antibiotics have been ordered and given within 1 hours of incision. Venous thrombosis prophylaxis was given with SCDs.    Procedure Details: Brought to operating IV sedation was given her abdomen is prepped and draped.  She of a previous lower midline incision I continued this incision up to the large umbilical hernia.  I encircled the hernia sac.  There is nonreducible.  I opened up the hernia sac right above the fascia.  The defect was lying 1.5 cm.  I resected by clamping and dividing the omentum that was incarcerated in the hernia sac.  I used 0 Vicryl to clamp this off.  I then cleaned out the fascial edges and closed the transversely with 0 Prolene in interrupted fashion.  Closed the skin incision with 2 layers.  Glue and a pressure dressing was placed  Evidence of Infection: No   Complications:  None; patient tolerated the procedure well.    Disposition: PACU - hemodynamically stable.  Condition: stable           Attending Attestation: I was present and scrubbed for the entire procedure.    Jim Urena  Phone Number: [phone]

## 2025-08-07 ASSESSMENT — PAIN SCALES - GENERAL: PAINLEVEL_OUTOF10: 3

## 2025-08-08 LAB
LABORATORY COMMENT REPORT: NORMAL
PATH REPORT.FINAL DX SPEC: NORMAL
PATH REPORT.GROSS SPEC: NORMAL
PATH REPORT.RELEVANT HX SPEC: NORMAL
PATH REPORT.TOTAL CANCER: NORMAL

## 2025-08-12 ENCOUNTER — APPOINTMENT (OUTPATIENT)
Dept: SURGERY | Facility: CLINIC | Age: 70
End: 2025-08-12
Payer: MEDICARE

## 2025-08-12 VITALS
HEART RATE: 65 BPM | TEMPERATURE: 97.8 F | DIASTOLIC BLOOD PRESSURE: 86 MMHG | SYSTOLIC BLOOD PRESSURE: 137 MMHG | WEIGHT: 200.9 LBS | BODY MASS INDEX: 36.75 KG/M2

## 2025-08-12 DIAGNOSIS — K43.9 VENTRAL HERNIA WITHOUT OBSTRUCTION OR GANGRENE: Primary | ICD-10-CM

## 2025-08-12 PROCEDURE — 1160F RVW MEDS BY RX/DR IN RCRD: CPT | Performed by: SURGERY

## 2025-08-12 PROCEDURE — 1126F AMNT PAIN NOTED NONE PRSNT: CPT | Performed by: SURGERY

## 2025-08-12 PROCEDURE — 3079F DIAST BP 80-89 MM HG: CPT | Performed by: SURGERY

## 2025-08-12 PROCEDURE — 3075F SYST BP GE 130 - 139MM HG: CPT | Performed by: SURGERY

## 2025-08-12 PROCEDURE — 1159F MED LIST DOCD IN RCRD: CPT | Performed by: SURGERY

## 2025-08-12 PROCEDURE — 1036F TOBACCO NON-USER: CPT | Performed by: SURGERY

## 2025-08-12 PROCEDURE — 99212 OFFICE O/P EST SF 10 MIN: CPT | Performed by: SURGERY

## 2025-08-12 ASSESSMENT — PAIN SCALES - GENERAL: PAINLEVEL_OUTOF10: 0-NO PAIN

## 2025-08-22 ENCOUNTER — RESULTS FOLLOW-UP (OUTPATIENT)
Dept: CARDIOLOGY | Facility: CLINIC | Age: 70
End: 2025-08-22
Payer: MEDICARE

## 2025-08-28 ENCOUNTER — APPOINTMENT (OUTPATIENT)
Dept: SURGERY | Facility: CLINIC | Age: 70
End: 2025-08-28
Payer: MEDICARE

## 2025-09-02 ENCOUNTER — TELEPHONE (OUTPATIENT)
Dept: CARDIOLOGY | Facility: CLINIC | Age: 70
End: 2025-09-02
Payer: MEDICARE

## (undated) DEVICE — Device

## (undated) DEVICE — TOWEL, SURGICAL, NEURO, O/R, 16 X 26, BLUE, STERILE

## (undated) DEVICE — DRESSING, GAUZE, SPONGE, VERSALON, 4 PLY, 2 X 2 IN, STERILE

## (undated) DEVICE — GLOVE, SURGICAL, PROTEXIS PI W/NEU-THERA, 7.5, PF, LF

## (undated) DEVICE — SUTURE, PROLENE, 0, 30 IN, CT-2, BLUE

## (undated) DEVICE — PAD, GROUNDING, ELECTROSURGICAL, W/9 FT CABLE, POLYHESIVE II, ADULT, LF

## (undated) DEVICE — STRIP, SKIN CLOSURE, STERI STRIP, REINFORCED, 0.5 X 4 IN

## (undated) DEVICE — SUTURE, VICRYL PLUS, 4-0, 27 IN, PS-2

## (undated) DEVICE — SUTURE, VICRYL PLUS 3-0, SH, 27IN

## (undated) DEVICE — ADHESIVE, SKIN, DERMABOND ADVANCED, 15CM, PEN-STYLE

## (undated) DEVICE — DRESSING, TRANSPARENT, TEGADERM, 4 X 4-3/4 IN